# Patient Record
Sex: MALE | Race: WHITE | NOT HISPANIC OR LATINO | Employment: FULL TIME | URBAN - METROPOLITAN AREA
[De-identification: names, ages, dates, MRNs, and addresses within clinical notes are randomized per-mention and may not be internally consistent; named-entity substitution may affect disease eponyms.]

---

## 2017-04-10 ENCOUNTER — OFFICE VISIT - HEALTHEAST (OUTPATIENT)
Dept: FAMILY MEDICINE | Facility: CLINIC | Age: 27
End: 2017-04-10

## 2017-04-10 DIAGNOSIS — J45.30 ASTHMA, MILD PERSISTENT: ICD-10-CM

## 2017-04-10 DIAGNOSIS — M22.2X9 PATELLOFEMORAL PAIN SYNDROME: ICD-10-CM

## 2017-06-12 ENCOUNTER — COMMUNICATION - HEALTHEAST (OUTPATIENT)
Dept: SCHEDULING | Facility: CLINIC | Age: 27
End: 2017-06-12

## 2017-06-12 DIAGNOSIS — J45.30 ASTHMA, MILD PERSISTENT: ICD-10-CM

## 2017-06-17 ENCOUNTER — COMMUNICATION - HEALTHEAST (OUTPATIENT)
Dept: SCHEDULING | Facility: CLINIC | Age: 27
End: 2017-06-17

## 2017-06-17 ENCOUNTER — COMMUNICATION - HEALTHEAST (OUTPATIENT)
Dept: FAMILY MEDICINE | Facility: CLINIC | Age: 27
End: 2017-06-17

## 2017-06-17 DIAGNOSIS — J45.30 ASTHMA, MILD PERSISTENT: ICD-10-CM

## 2018-07-16 ENCOUNTER — OFFICE VISIT - HEALTHEAST (OUTPATIENT)
Dept: FAMILY MEDICINE | Facility: CLINIC | Age: 28
End: 2018-07-16

## 2018-07-16 DIAGNOSIS — J45.40 ASTHMA, MODERATE PERSISTENT, POORLY-CONTROLLED: ICD-10-CM

## 2018-07-16 DIAGNOSIS — Z00.00 ROUTINE GENERAL MEDICAL EXAMINATION AT A HEALTH CARE FACILITY: ICD-10-CM

## 2018-07-16 DIAGNOSIS — Z13.0 SCREENING FOR DEFICIENCY ANEMIA: ICD-10-CM

## 2018-07-16 DIAGNOSIS — R03.0 ELEVATED BLOOD PRESSURE READING WITHOUT DIAGNOSIS OF HYPERTENSION: ICD-10-CM

## 2018-07-16 DIAGNOSIS — Z13.1 SCREENING FOR DIABETES MELLITUS: ICD-10-CM

## 2018-07-16 DIAGNOSIS — Z13.220 SCREENING FOR CHOLESTEROL LEVEL: ICD-10-CM

## 2018-07-16 DIAGNOSIS — M76.892 HIP FLEXOR TENDINITIS, LEFT: ICD-10-CM

## 2018-07-16 DIAGNOSIS — F10.10 ALCOHOL ABUSE: ICD-10-CM

## 2018-07-16 LAB
ALBUMIN SERPL-MCNC: 4.4 G/DL (ref 3.5–5)
ALP SERPL-CCNC: 73 U/L (ref 45–120)
ALT SERPL W P-5'-P-CCNC: 55 U/L (ref 0–45)
ANION GAP SERPL CALCULATED.3IONS-SCNC: 13 MMOL/L (ref 5–18)
AST SERPL W P-5'-P-CCNC: 35 U/L (ref 0–40)
BILIRUB SERPL-MCNC: 0.3 MG/DL (ref 0–1)
BUN SERPL-MCNC: 13 MG/DL (ref 8–22)
CALCIUM SERPL-MCNC: 9.4 MG/DL (ref 8.5–10.5)
CHLORIDE BLD-SCNC: 106 MMOL/L (ref 98–107)
CHOLEST SERPL-MCNC: 267 MG/DL
CHOLEST SERPL-MCNC: 271 MG/DL
CO2 SERPL-SCNC: 25 MMOL/L (ref 22–31)
CREAT SERPL-MCNC: 0.87 MG/DL (ref 0.7–1.3)
ERYTHROCYTE [DISTWIDTH] IN BLOOD BY AUTOMATED COUNT: 11.1 % (ref 11–14.5)
GFR SERPL CREATININE-BSD FRML MDRD: >60 ML/MIN/1.73M2
GLUCOSE BLD-MCNC: 88 MG/DL (ref 70–125)
HCT VFR BLD AUTO: 42.1 % (ref 40–54)
HDLC SERPL-MCNC: 85 MG/DL
HGB BLD-MCNC: 14.5 G/DL (ref 14–18)
LDLC SERPL CALC-MCNC: 162 MG/DL
MCH RBC QN AUTO: 32.4 PG (ref 27–34)
MCHC RBC AUTO-ENTMCNC: 34.5 G/DL (ref 32–36)
MCV RBC AUTO: 94 FL (ref 80–100)
PLATELET # BLD AUTO: 232 THOU/UL (ref 140–440)
PMV BLD AUTO: 7.7 FL (ref 7–10)
POTASSIUM BLD-SCNC: 4 MMOL/L (ref 3.5–5)
PROT SERPL-MCNC: 7.4 G/DL (ref 6–8)
RBC # BLD AUTO: 4.48 MILL/UL (ref 4.4–6.2)
SODIUM SERPL-SCNC: 144 MMOL/L (ref 136–145)
TRIGL SERPL-MCNC: 102 MG/DL
TSH SERPL DL<=0.005 MIU/L-ACNC: 1.39 UIU/ML (ref 0.3–5)
WBC: 6.5 THOU/UL (ref 4–11)

## 2018-07-16 ASSESSMENT — MIFFLIN-ST. JEOR: SCORE: 1777.73

## 2018-11-14 ENCOUNTER — COMMUNICATION - HEALTHEAST (OUTPATIENT)
Dept: FAMILY MEDICINE | Facility: CLINIC | Age: 28
End: 2018-11-14

## 2019-04-19 ENCOUNTER — OFFICE VISIT - HEALTHEAST (OUTPATIENT)
Dept: FAMILY MEDICINE | Facility: CLINIC | Age: 29
End: 2019-04-19

## 2019-04-19 DIAGNOSIS — F10.10 ALCOHOL ABUSE: ICD-10-CM

## 2019-04-19 DIAGNOSIS — J45.40 ASTHMA, MODERATE PERSISTENT, POORLY-CONTROLLED: ICD-10-CM

## 2019-04-19 DIAGNOSIS — R03.0 ELEVATED BLOOD PRESSURE READING WITHOUT DIAGNOSIS OF HYPERTENSION: ICD-10-CM

## 2019-04-19 DIAGNOSIS — M70.61 GREATER TROCHANTERIC BURSITIS OF RIGHT HIP: ICD-10-CM

## 2019-04-19 ASSESSMENT — MIFFLIN-ST. JEOR: SCORE: 1824.78

## 2019-10-10 ENCOUNTER — COMMUNICATION - HEALTHEAST (OUTPATIENT)
Dept: FAMILY MEDICINE | Facility: CLINIC | Age: 29
End: 2019-10-10

## 2020-01-09 ENCOUNTER — OFFICE VISIT - HEALTHEAST (OUTPATIENT)
Dept: FAMILY MEDICINE | Facility: CLINIC | Age: 30
End: 2020-01-09

## 2020-01-09 ENCOUNTER — COMMUNICATION - HEALTHEAST (OUTPATIENT)
Dept: FAMILY MEDICINE | Facility: CLINIC | Age: 30
End: 2020-01-09

## 2020-01-09 DIAGNOSIS — J45.40 ASTHMA, MODERATE PERSISTENT, POORLY-CONTROLLED: ICD-10-CM

## 2020-01-09 DIAGNOSIS — F17.210 CIGARETTE NICOTINE DEPENDENCE WITHOUT COMPLICATION: ICD-10-CM

## 2020-01-09 DIAGNOSIS — Z11.3 SCREEN FOR STD (SEXUALLY TRANSMITTED DISEASE): ICD-10-CM

## 2020-01-09 LAB — HIV 1+2 AB+HIV1 P24 AG SERPL QL IA: NEGATIVE

## 2020-01-10 LAB
C TRACH DNA SPEC QL PROBE+SIG AMP: NEGATIVE
HBV SURFACE AB SERPL IA-ACNC: POSITIVE M[IU]/ML
HBV SURFACE AG SERPL QL IA: NEGATIVE
HCV AB SERPL QL IA: NEGATIVE
HSV1 IGG SERPL QL IA: NEGATIVE
HSV2 IGG SERPL QL IA: NEGATIVE
N GONORRHOEA DNA SPEC QL NAA+PROBE: NEGATIVE
T PALLIDUM AB SER QL: NEGATIVE

## 2020-05-12 ENCOUNTER — COMMUNICATION - HEALTHEAST (OUTPATIENT)
Dept: FAMILY MEDICINE | Facility: CLINIC | Age: 30
End: 2020-05-12

## 2020-05-13 ENCOUNTER — COMMUNICATION - HEALTHEAST (OUTPATIENT)
Dept: FAMILY MEDICINE | Facility: CLINIC | Age: 30
End: 2020-05-13

## 2020-05-13 ENCOUNTER — OFFICE VISIT - HEALTHEAST (OUTPATIENT)
Dept: FAMILY MEDICINE | Facility: CLINIC | Age: 30
End: 2020-05-13

## 2020-05-13 ENCOUNTER — AMBULATORY - HEALTHEAST (OUTPATIENT)
Dept: FAMILY MEDICINE | Facility: CLINIC | Age: 30
End: 2020-05-13

## 2020-05-13 DIAGNOSIS — R39.9 URINARY SYMPTOM OR SIGN: ICD-10-CM

## 2020-05-13 LAB
ALBUMIN UR-MCNC: NEGATIVE MG/DL
APPEARANCE UR: CLEAR
BILIRUB UR QL STRIP: NEGATIVE
COLOR UR AUTO: YELLOW
GLUCOSE UR STRIP-MCNC: NEGATIVE MG/DL
HGB UR QL STRIP: NEGATIVE
KETONES UR STRIP-MCNC: NEGATIVE MG/DL
LEUKOCYTE ESTERASE UR QL STRIP: NEGATIVE
NITRATE UR QL: NEGATIVE
PH UR STRIP: 7 [PH] (ref 5–8)
SP GR UR STRIP: 1.01 (ref 1–1.03)
UROBILINOGEN UR STRIP-ACNC: NORMAL

## 2020-05-15 ENCOUNTER — COMMUNICATION - HEALTHEAST (OUTPATIENT)
Dept: FAMILY MEDICINE | Facility: CLINIC | Age: 30
End: 2020-05-15

## 2020-05-15 LAB
C TRACH DNA SPEC QL PROBE+SIG AMP: NEGATIVE
N GONORRHOEA DNA SPEC QL NAA+PROBE: NEGATIVE

## 2020-05-18 ENCOUNTER — OFFICE VISIT - HEALTHEAST (OUTPATIENT)
Dept: FAMILY MEDICINE | Facility: CLINIC | Age: 30
End: 2020-05-18

## 2020-05-18 DIAGNOSIS — R10.31 RLQ ABDOMINAL PAIN: ICD-10-CM

## 2020-05-18 DIAGNOSIS — R71.0 DECREASED HEMOGLOBIN: ICD-10-CM

## 2020-05-18 DIAGNOSIS — K52.9 FREQUENT STOOLS: ICD-10-CM

## 2020-05-18 LAB
ALBUMIN SERPL-MCNC: 3.3 G/DL (ref 3.5–5)
ALBUMIN UR-MCNC: NEGATIVE MG/DL
ALP SERPL-CCNC: 177 U/L (ref 45–120)
ALT SERPL W P-5'-P-CCNC: 101 U/L (ref 0–45)
ANION GAP SERPL CALCULATED.3IONS-SCNC: 8 MMOL/L (ref 5–18)
APPEARANCE UR: CLEAR
AST SERPL W P-5'-P-CCNC: 56 U/L (ref 0–40)
BILIRUB SERPL-MCNC: 0.7 MG/DL (ref 0–1)
BILIRUB UR QL STRIP: NEGATIVE
BUN SERPL-MCNC: 11 MG/DL (ref 8–22)
C REACTIVE PROTEIN LHE: 0.3 MG/DL (ref 0–0.8)
CALCIUM SERPL-MCNC: 9.1 MG/DL (ref 8.5–10.5)
CHLORIDE BLD-SCNC: 103 MMOL/L (ref 98–107)
CO2 SERPL-SCNC: 30 MMOL/L (ref 22–31)
COLOR UR AUTO: YELLOW
CREAT SERPL-MCNC: 0.76 MG/DL (ref 0.7–1.3)
ERYTHROCYTE [DISTWIDTH] IN BLOOD BY AUTOMATED COUNT: 11.5 % (ref 11–14.5)
GFR SERPL CREATININE-BSD FRML MDRD: >60 ML/MIN/1.73M2
GLUCOSE BLD-MCNC: 99 MG/DL (ref 70–125)
GLUCOSE UR STRIP-MCNC: NEGATIVE MG/DL
HCT VFR BLD AUTO: 39.4 % (ref 40–54)
HGB BLD-MCNC: 13.5 G/DL (ref 14–18)
HGB UR QL STRIP: NEGATIVE
KETONES UR STRIP-MCNC: NEGATIVE MG/DL
LEUKOCYTE ESTERASE UR QL STRIP: NEGATIVE
MCH RBC QN AUTO: 34.7 PG (ref 27–34)
MCHC RBC AUTO-ENTMCNC: 34.3 G/DL (ref 32–36)
MCV RBC AUTO: 101 FL (ref 80–100)
NITRATE UR QL: NEGATIVE
PH UR STRIP: 7 [PH] (ref 5–8)
PLATELET # BLD AUTO: 371 THOU/UL (ref 140–440)
PMV BLD AUTO: 7.2 FL (ref 7–10)
POTASSIUM BLD-SCNC: 4.8 MMOL/L (ref 3.5–5)
PROT SERPL-MCNC: 6.5 G/DL (ref 6–8)
RBC # BLD AUTO: 3.89 MILL/UL (ref 4.4–6.2)
SODIUM SERPL-SCNC: 141 MMOL/L (ref 136–145)
SP GR UR STRIP: 1.02 (ref 1–1.03)
UROBILINOGEN UR STRIP-ACNC: NORMAL
WBC: 5.3 THOU/UL (ref 4–11)

## 2020-05-18 ASSESSMENT — MIFFLIN-ST. JEOR: SCORE: 1910.88

## 2020-06-29 ENCOUNTER — COMMUNICATION - HEALTHEAST (OUTPATIENT)
Dept: FAMILY MEDICINE | Facility: CLINIC | Age: 30
End: 2020-06-29

## 2020-06-29 DIAGNOSIS — R10.31 RLQ ABDOMINAL PAIN: ICD-10-CM

## 2020-07-08 ENCOUNTER — HOSPITAL ENCOUNTER (OUTPATIENT)
Dept: CT IMAGING | Facility: CLINIC | Age: 30
Discharge: HOME | End: 2020-07-08
Attending: FAMILY MEDICINE

## 2020-07-08 DIAGNOSIS — R10.31 RLQ ABDOMINAL PAIN: ICD-10-CM

## 2020-07-10 ENCOUNTER — AMBULATORY - HEALTHEAST (OUTPATIENT)
Dept: FAMILY MEDICINE | Facility: CLINIC | Age: 30
End: 2020-07-10

## 2020-07-10 DIAGNOSIS — R10.31 ABDOMINAL PAIN, RIGHT LOWER QUADRANT: ICD-10-CM

## 2020-07-10 DIAGNOSIS — R10.32 ABDOMINAL PAIN, LEFT LOWER QUADRANT: ICD-10-CM

## 2020-08-10 ENCOUNTER — AMBULATORY - HEALTHEAST (OUTPATIENT)
Dept: FAMILY MEDICINE | Facility: CLINIC | Age: 30
End: 2020-08-10

## 2020-08-10 DIAGNOSIS — R10.31 RLQ ABDOMINAL PAIN: ICD-10-CM

## 2020-08-10 DIAGNOSIS — K52.9 FREQUENT STOOLS: ICD-10-CM

## 2020-10-01 ENCOUNTER — COMMUNICATION - HEALTHEAST (OUTPATIENT)
Dept: FAMILY MEDICINE | Facility: CLINIC | Age: 30
End: 2020-10-01

## 2020-10-07 ENCOUNTER — COMMUNICATION - HEALTHEAST (OUTPATIENT)
Dept: FAMILY MEDICINE | Facility: CLINIC | Age: 30
End: 2020-10-07

## 2020-10-07 DIAGNOSIS — J45.40 ASTHMA, MODERATE PERSISTENT, POORLY-CONTROLLED: ICD-10-CM

## 2020-10-15 ENCOUNTER — OFFICE VISIT - HEALTHEAST (OUTPATIENT)
Dept: SURGERY | Facility: CLINIC | Age: 30
End: 2020-10-15

## 2020-10-15 DIAGNOSIS — R10.31 RIGHT LOWER QUADRANT PAIN: ICD-10-CM

## 2020-10-15 ASSESSMENT — MIFFLIN-ST. JEOR: SCORE: 1978.86

## 2020-11-30 ENCOUNTER — COMMUNICATION - HEALTHEAST (OUTPATIENT)
Dept: FAMILY MEDICINE | Facility: CLINIC | Age: 30
End: 2020-11-30

## 2020-11-30 DIAGNOSIS — J45.40 ASTHMA, MODERATE PERSISTENT, POORLY-CONTROLLED: ICD-10-CM

## 2020-11-30 RX ORDER — ALBUTEROL SULFATE 90 UG/1
2 AEROSOL, METERED RESPIRATORY (INHALATION) EVERY 6 HOURS PRN
Qty: 3 INHALER | Refills: 2 | Status: SHIPPED | OUTPATIENT
Start: 2020-11-30 | End: 2021-09-15

## 2021-03-20 ENCOUNTER — COMMUNICATION - HEALTHEAST (OUTPATIENT)
Dept: FAMILY MEDICINE | Facility: CLINIC | Age: 31
End: 2021-03-20

## 2021-03-20 DIAGNOSIS — J45.40 ASTHMA, MODERATE PERSISTENT, POORLY-CONTROLLED: ICD-10-CM

## 2021-03-20 RX ORDER — DEXAMETHASONE 4 MG/1
TABLET ORAL
Qty: 1 INHALER | Refills: 0 | Status: SHIPPED | OUTPATIENT
Start: 2021-03-20 | End: 2021-09-15

## 2021-05-26 VITALS — TEMPERATURE: 97.8 F

## 2021-05-28 ASSESSMENT — ASTHMA QUESTIONNAIRES
ACT_TOTALSCORE: 17
ACT_TOTALSCORE: 13

## 2021-05-28 NOTE — PROGRESS NOTES
Assessment/ Plan  1. Greater trochanteric bursitis of right hip  Not all indicators fit with this diagnosis, does not have a lot of tenderness over the greater trochanter but does have lateral hip pain, hurts with weightbearing, some tenderness present.  Discussed stretching exercises, recommend ibuprofen or Tylenol, heat or ice, this is only 4 days old, if it does not improve over the next week, I think he should come in for an x-ray.  Apologized for the lack of x-ray availability today.  2. Asthma, moderate persistent, poorly-controlled  Restart medications Symbicort and albuterol, encourage smoking cessation  - albuterol (VENTOLIN HFA) 90 mcg/actuation inhaler; Inhale 2 puffs every 6 (six) hours as needed for wheezing.  Dispense: 3 Inhaler; Refill: 2  - budesonide-formoterol (SYMBICORT) 160-4.5 mcg/actuation inhaler; Inhale 1 puff 2 (two) times a day.  Dispense: 1 Inhaler; Refill: 2    3. Elevated blood pressure reading without diagnosis of hypertension  At a level that needs to be treated if it persists.  Likely related to alcohol overuse  Recommend getting blood pressures checked 3 or 4 times, following up within 2 to 4 weeks with primary care doctor.  4. Alcohol abuse  That this is previously been discussed with primary care, offered referral to chemical dependency for intake.  Patient declined for now.    Body mass index is 31.09 kg/m .    Subjective  CC:  Chief Complaint   Patient presents with     Hip Pain     right hip     HPI:  RHip pain    Duration/ timing of onset: 4 days  Location of pain lat and then posterior    Severity/ Quality: dull, sharp with movement  Modifying factors: Make it worse- lifting leg   Make it better-rest  History of hip  problems/injury or previous work-up/ treatment? Saw someone      Discussed patient's elevated blood pressure today.  He is adopted, does not know family history    Drinks fairly heavily, regularly, drink last night  Indicates that he knows it is a problem.  Not  "currently ready to do treatment.    Asthma has been out of control 2.  He smokes cigarettes and is been out of medications for a while.  Chronic congested cough, mild shortness of breath    Patient Active Problem List   Diagnosis     Paronychia of finger     Asthma, moderate persistent, poorly-controlled     Current medications reviewed as follows:  Current Outpatient Medications on File Prior to Visit   Medication Sig     [DISCONTINUED] albuterol (VENTOLIN HFA) 90 mcg/actuation inhaler Inhale 2 puffs every 6 (six) hours as needed for wheezing.     [DISCONTINUED] budesonide-formoterol (SYMBICORT) 160-4.5 mcg/actuation inhaler Inhale 1 puff 2 (two) times a day.     Current Facility-Administered Medications on File Prior to Visit   Medication     cefTRIAXone injection 250 mg (ROCEPHIN)     Social History     Tobacco Use   Smoking Status Current Every Day Smoker     Packs/day: 1.00   Smokeless Tobacco Never Used     Social History     Social History Narrative     Not on file     Patient Care Team:  Josette Wright CNP as PCP - General (Nurse Practitioner)  ROS  Full 10 system review including constitutional, respiratory, cardiac, gi, urinary, rheumatologic, neurologic, reproductive, dermatologic psychiatric is  performed (via questionnaire) and is negative         Objective  Physical Exam  Vitals:    04/19/19 1022   BP: (!) 160/100   Patient Site: Right Arm   Patient Position: Sitting   Cuff Size: Adult Regular   Pulse: 88   Resp: 18   Weight: 200 lb (90.7 kg)   Height: 5' 7.25\" (1.708 m)     Right hip is examined.  There is some tenderness with external rotation on the lateral side, mild tenderness over palpation over the greater trochanter.  Tenderness with internal rotation, negative straight leg raise, no tenderness over the i iliac spine, sacroiliac region or or the ischio tuberosity  Diagnostics  None    Please note: Voice recognition software was used in this dictation.  It may therefore contain typographical " errors.

## 2021-05-30 VITALS — BODY MASS INDEX: 30.07 KG/M2 | WEIGHT: 192 LBS

## 2021-06-01 VITALS — WEIGHT: 190.5 LBS | HEIGHT: 67 IN | BODY MASS INDEX: 29.9 KG/M2

## 2021-06-02 VITALS — HEIGHT: 67 IN | BODY MASS INDEX: 31.39 KG/M2 | WEIGHT: 200 LBS

## 2021-06-03 VITALS
WEIGHT: 207 LBS | DIASTOLIC BLOOD PRESSURE: 88 MMHG | OXYGEN SATURATION: 98 % | BODY MASS INDEX: 32.18 KG/M2 | SYSTOLIC BLOOD PRESSURE: 140 MMHG | HEART RATE: 83 BPM

## 2021-06-04 VITALS
SYSTOLIC BLOOD PRESSURE: 148 MMHG | WEIGHT: 234 LBS | DIASTOLIC BLOOD PRESSURE: 98 MMHG | BODY MASS INDEX: 36.73 KG/M2 | HEIGHT: 67 IN

## 2021-06-04 VITALS
BODY MASS INDEX: 34.37 KG/M2 | SYSTOLIC BLOOD PRESSURE: 126 MMHG | DIASTOLIC BLOOD PRESSURE: 80 MMHG | HEIGHT: 67 IN | OXYGEN SATURATION: 98 % | TEMPERATURE: 98.8 F | HEART RATE: 99 BPM | RESPIRATION RATE: 18 BRPM | WEIGHT: 219 LBS

## 2021-06-05 NOTE — PROGRESS NOTES
OFFICE VISIT - FAMILY MEDICINE     ASSESSMENT AND PLAN     1. Screen for STD (sexually transmitted disease)  HIV Antigen/Antibody Screening Cascade    Chlamydia trachomatis & Neisseria gonorrhoeae, Amplified Detection    Syphilis Screen, Cascade    Hepatitis B Surface Antibody (Anti-HBs) Vaccine Check    Hepatitis B Surface Antigen (HBsAG)    Hepatitis C Antibody (Anti-HCV)    Herpes simplex Virus (Type 1 and 2) IgG Antibody   2. Asthma, moderate persistent, poorly-controlled  albuterol (VENTOLIN HFA) 90 mcg/actuation inhaler    fluticasone propionate (FLOVENT HFA) 110 mcg/actuation inhaler    predniSONE (DELTASONE) 10 mg tablet    DISCONTINUED: pneumococcal vaccine (PNEUMOVAX-23) 25 mcg/0.5 mL injection    DISCONTINUED: predniSONE (DELTASONE) 10 mg tablet   3. Cigarette nicotine dependence without complication     Screening STD and safe sex personally discussed.  Lab result pending.  Asthma, discussed management with smoking cessation, Symbicort too expensive, trial of Flovent daily, and albuterol as needed, pneumonia and flu vaccine today.  Asthma action plan reviewed with the patient.  A total of  4  minutes was spent discussing tobacco cessation program and different options treatment.    CHIEF COMPLAINT   STD testing; refill inhalers (pt requesting generic due to price); and possible flu shot    HPI   Faustino Power is a 29 y.o. male.  No Patient Care Coordination Note on file.    New girlfriend would like him to be checked for STDs, has not had any symptoms.  Does have a remote history of chlamydia that was treated.  Moderate asthma unfortunately smoker, we discussed the importance of smoking cessation.  Current control Symbicort s very expensive about $300.  Has tried Flovent in the past and responded well.      Review of Systems As per HPI, otherwise negative.    OBJECTIVE   /88   Pulse 83   Wt 207 lb (93.9 kg)   SpO2 98%   BMI 32.18 kg/m    Physical Exam    PFSH   No family history on  file.  Social History     Socioeconomic History     Marital status: Single     Spouse name: Not on file     Number of children: Not on file     Years of education: Not on file     Highest education level: Not on file   Occupational History     Not on file   Social Needs     Financial resource strain: Not on file     Food insecurity:     Worry: Not on file     Inability: Not on file     Transportation needs:     Medical: Not on file     Non-medical: Not on file   Tobacco Use     Smoking status: Current Every Day Smoker     Packs/day: 1.00     Smokeless tobacco: Never Used   Substance and Sexual Activity     Alcohol use: Yes     Alcohol/week: 28.0 standard drinks     Types: 14 Glasses of wine, 14 Cans of beer per week     Drug use: Not on file     Sexual activity: Not on file   Lifestyle     Physical activity:     Days per week: Not on file     Minutes per session: Not on file     Stress: Not on file   Relationships     Social connections:     Talks on phone: Not on file     Gets together: Not on file     Attends Islam service: Not on file     Active member of club or organization: Not on file     Attends meetings of clubs or organizations: Not on file     Relationship status: Not on file     Intimate partner violence:     Fear of current or ex partner: Not on file     Emotionally abused: Not on file     Physically abused: Not on file     Forced sexual activity: Not on file   Other Topics Concern     Not on file   Social History Narrative     Not on file     Relevant history was reviewed with the patient today, unless noted in HPI, nothing is pertinent for this visit.  [facility]     Patient Active Problem List    Diagnosis Date Noted     Cigarette nicotine dependence without complication 01/09/2020     Asthma, moderate persistent, poorly-controlled 04/10/2017     Paronychia of finger 06/18/2015     No past surgical history on file.    RESULTS/CONSULTS (Lab/Rad)   No results found for this or any previous visit (from  the past 168 hour(s)).  No results found.  MEDICATIONS     Current Outpatient Medications on File Prior to Visit   Medication Sig Dispense Refill     [DISCONTINUED] albuterol (VENTOLIN HFA) 90 mcg/actuation inhaler Inhale 2 puffs every 6 (six) hours as needed for wheezing. 3 Inhaler 2     [DISCONTINUED] budesonide-formoterol (SYMBICORT) 160-4.5 mcg/actuation inhaler Inhale 1 puff 2 (two) times a day. 1 Inhaler 2     Current Facility-Administered Medications on File Prior to Visit   Medication Dose Route Frequency Provider Last Rate Last Dose     cefTRIAXone injection 250 mg (ROCEPHIN)  250 mg Intramuscular Q24H Hannah Muller DO   250 mg at 12/07/16 1115       HEALTH MAINTENANCE / SCREENING   PHQ-2 Total Score: 0 (1/9/2020  1:20 PM)  , No data recorded,No data recorded  Immunization History   Administered Date(s) Administered     Dtap 1989, 1989, 01/31/1990, 02/05/1991, 08/17/1994     HPV Quadrivalent 09/05/2012     Hep A, Adult IM (19yr & older) 09/05/2012, 07/16/2018     Hep B, Peds or Adolescent 08/27/1998, 10/01/1998, 08/19/1999     Hib (PRP-T) 11/05/1990     Influenza, Seasonal, Inj PF IIV3 12/10/2007, 09/05/2012     Influenza,seasonal quad, PF, =/> 6months 12/07/2016, 01/09/2020     Influenza,seasonal, Inj IIV3 11/24/2003, 10/13/2004, 12/27/2006, 10/27/2014     MMR 05/24/1990, 11/05/1990, 10/01/1998     Meningococcal MCV4P 12/10/2007     OPV,Trivalent,Historic(1967-1660 only) 1989, 1989, 02/05/1991, 08/17/1994     Pneumo Polysac 23-V 01/09/2020     Td, adult adsorbed, PF 07/16/2018     Tdap 07/17/2000, 12/10/2007     Varicella 12/27/2006, 09/27/2008     Health Maintenance   Topic     ASTHMA ACTION PLAN      PNEUMOCOCCAL IMMUNIZATION 19-64 MEDIUM RISK (1 of 1 - PPSV23)     PREVENTIVE CARE VISIT      INFLUENZA VACCINE RULE BASED (1)     ASTHMA CONTROL TEST      ADVANCE CARE PLANNING      TD 18+ HE      HIV SCREENING      TDAP ADULT ONE TIME DOSE      I have counseled the patient for  tobacco cessation and the follow up will occur  at the next visit.  Rohan Holguin MD  Family Medicine, [facility]     This note was dictated using a voice recognition software.  Any grammatical or context distortion are unintentional and inherent to the software.

## 2021-06-08 NOTE — PROGRESS NOTES
Assessment and Plan    RLQ pain ongoing, spreading to left and down into groin, normal abdominal exam, no red flags. Had been drinking half a liter a day - now less - also smokes and drinks lots of coffee. Long history of stool frequency 5x/day, less recently but still at least 3x/day.  Hemoglobin low. Has psoriasis. Notes hip to back pain. No clear diagnosis, but auto-immune colitis is in the differential. Hgb came back lower that previous, so will have him do take home hemoccult test    1. RLQ abdominal pain/2. Frequent stools    - HM2(CBC w/o Differential)  - Comprehensive Metabolic Panel  - Urinalysis-UC if Indicated  - C-Reactive Protein  - Fecal Occult Blood X 3(Clinic Only); Future    3.. Decreased hemoglobin  - Fecal Occult Blood X 3(Clinic Only); Future        Return for if symptoms are not improving, and pending results of tests.    Audrey Loja MD     -------------------------------------------    Chief Complaint   Patient presents with     Abdominal Pain     RLQ  with trouble urinating      pain spreading into right testicle  and into mid pelvic area     Virtual visit Dr. Marcelo 5/13/20    HPI: Patient c/o onset of RLQ pressure with brief pain starting two weeks ago.  He also noted somewhat decreased urine production and darker colored urine.  He has not noted burning during urination but sometimes gets a sharp pain after urination. No odor to urine.  No blood noted in urine.  He does c/o frequency of urination--often small amount of urine and needing to urinate soon after.  He has noted that it is a little hard to get urine started.  He denies penile discharge.  No vomiting or diarrhea.  No bulging in either groin.   No testicular pain.  No pain with his palpation of abdomen.  No fever.  No new sexual partners.  Had not had intercourse for two months, then one time about a week ago after these symptoms had started  Has had chlamydia infection a few years ago--These symptoms are not the same.  1.  "Urinary symptom or sign  Symptoms unlikely to represent epididymitis, appendicitis, hernia.  Will evaluate for UTI and chlamydia/gonorrhea.  If these tests are normal, will likely need in person visit.     Continue adequate fluid intake.     - Urinalysis-UC if Indicated  - Chlamydia trachomatis & Neisseria gonorrhoeae, Amplified Detection    Lab Results   Component Value Date    COLORU Yellow 05/18/2020    CLARITYU Clear 05/18/2020    GLUCOSEU Negative 05/18/2020    BILIRUBINUR Negative 05/18/2020    KETONESU Negative 05/18/2020    SPECGRAV 1.025 05/18/2020    HGBUA Negative 05/18/2020    PHUR 7.0 05/18/2020    PROTEINUA Negative 05/18/2020    UROBILINOGEN 1.0 E.U./dL 05/18/2020    NITRITE Negative 05/18/2020    LEUKOCYTESUR Negative 05/18/2020    BACTERIA Few (!) 06/18/2015    RBCUA 0-5 06/18/2015    WBCUA 10-25 (!) 06/18/2015    SQUAMEPI 0-5 06/18/2015     ----  Faustino reviewed that his symptoms started after three days where he did feel sick and had some vomiting; it was also after a period where he was drinking more heavily.    His RLQ  pain is no worse, just not better and now spreading to left lower abdomen and lower down on right into groin and thigh.  He describes it as more of a dull ache or uncomfortableness. He has no pain with eating. Sitting down, bending over , and lying on his right side  Make it worse    Drinking also makes the pain worse.  His \"normal\" intake is half a liter or whisky daily, but he has been trying to cut back. He acknowledges hearing from his providers that this is a problem and is dangerous - he has been trying to cut back recently.  He also \"smokes a lot and drinks a lot of coffee.\"    No difficulty with BMs. For the last 5-10 years has had loose BMs - recently firmer - being at home and eating 3+ meals a day - but he still has at least 3 stools a day    Also since this started happening 3 weeks ago, feels like he has to pee more often , but less successful - hard to start, " doesn't flow as well, prolonged dribbling - if he drinks a lot of water it resumes normally.  No blood in urine.  Hurts a little toward the end with urination; not so much when urine is more dilute    ROS    No fever  No vomiting or lack of appetite  No weight loss  Using abdominal muscles hurts a little  Adopted  no foreign travel, No camping or other clear exposure to parasite  Has had joint pains for the last year - pain in both hips - right or left hip - in front and side at beginning of the day, more in back at then end of the day - wakes up with it  psoriasis    ACT Total Score: (!) 17 (5/18/2020  1:06 PM)  (not discussed today)      Current Outpatient Medications on File Prior to Visit   Medication Sig Dispense Refill     albuterol (VENTOLIN HFA) 90 mcg/actuation inhaler Inhale 2 puffs every 6 (six) hours as needed for wheezing. 3 Inhaler 2     fluticasone propionate (FLOVENT HFA) 110 mcg/actuation inhaler Inhale 2 puffs 2 (two) times a day. 1 Inhaler 2     No current facility-administered medications on file prior to visit.              The history section was last reviewed by Suzanne Bender CMA on May 18, 2020.    Social History     Tobacco Use   Smoking Status Current Every Day Smoker     Packs/day: 1.00   Smokeless Tobacco Never Used       Social History     Substance and Sexual Activity   Alcohol Use Yes     Alcohol/week: 28.0 standard drinks     Types: 14 Glasses of wine, 14 Cans of beer per week         Vitals:    05/18/20 1308   BP: 126/80   Pulse: 99   Resp: 18   Temp: 98.8  F (37.1  C)   SpO2: 98%     Body mass index is 34.05 kg/m .     EXAM:    General appearance - alert, well appearing, and in no distress and overweight  Mental status - normal mood, behavior, speech, dress, motor activity, and thought processes  Chest - clear to auscultation except for scattered faint inspiratory wheezes  Heart - normal rate, regular rhythm, normal S1, S2, no murmurs, rubs, clicks or gallops  Abdomen - soft,  nontender, nondistended, no masses or organomegaly   Male - SCROTUM: normal, no masses, no tenderness    Recent Results (from the past 24 hour(s))   HM2(CBC w/o Differential)    Collection Time: 05/18/20  1:54 PM   Result Value Ref Range    WBC 5.3 4.0 - 11.0 thou/uL    RBC 3.89 (L) 4.40 - 6.20 mill/uL    Hemoglobin 13.5 (L) 14.0 - 18.0 g/dL    Hematocrit 39.4 (L) 40.0 - 54.0 %     (H) 80 - 100 fL    MCH 34.7 (H) 27.0 - 34.0 pg    MCHC 34.3 32.0 - 36.0 g/dL    RDW 11.5 11.0 - 14.5 %    Platelets 371 140 - 440 thou/uL    MPV 7.2 7.0 - 10.0 fL   Urinalysis-UC if Indicated    Collection Time: 05/18/20  1:57 PM   Result Value Ref Range    Color, UA Yellow Colorless, Yellow, Straw, Light Yellow    Clarity, UA Clear Clear    Glucose, UA Negative Negative    Bilirubin, UA Negative Negative    Ketones, UA Negative Negative    Specific Gravity, UA 1.025 1.005 - 1.030    Blood, UA Negative Negative    pH, UA 7.0 5.0 - 8.0    Protein, UA Negative Negative mg/dL    Urobilinogen, UA 1.0 E.U./dL 0.2 E.U./dL, 1.0 E.U./dL    Nitrite, UA Negative Negative    Leukocytes, UA Negative Negative

## 2021-06-08 NOTE — TELEPHONE ENCOUNTER
Left message to call back for: Patient  Information to relay to patient:  Left detailed message for pt letting him know that that we just got word a little bit ago that Josette is going to be starting her maternity leave and will not be available tomorrow for the telephone visit that he has scheduled.  I advised he call back and we can reschedule him with someone else that has openings.

## 2021-06-08 NOTE — PROGRESS NOTES
"  Faustino Power is a 29 y.o. male who is being evaluated via a billable telephone visit.      The patient has been notified of following:     \"This telephone visit will be conducted via a call between you and your physician/provider. We have found that certain health care needs can be provided without the need for a physical exam.  This service lets us provide the care you need with a short phone conversation.  If a prescription is necessary we can send it directly to your pharmacy.  If lab work is needed we can place an order for that and you can then stop by our lab to have the test done at a later time.    Telephone visits are billed at different rates depending on your insurance coverage. During this emergency period, for some insurers they may be billed the same as an in-person visit.  Please reach out to your insurance provider with any questions.    If during the course of the call the physician/provider feels a telephone visit is not appropriate, you will not be charged for this service.\"    Patient has given verbal consent to a Telephone visit? Yes    Patient would like to receive their AVS by AVS Preference: Jeannette.    Subjective:     Faustino Power is a 29 y.o. male who calls with complaint of    Chief Complaint   Patient presents with     Urinary Tract Infection     Patient was evaluated by telephone rather than an in person visit due to currently outbreak of COVID-19.        The following portions of the patient's history were reviewed and updated as appropriate: allergies, current medications and problem list     HPI: Patient c/o onset of RLQ pressure with brief pain starting two weeks ago.  He also noted somewhat decreased urine production and darker colored urine.  He has not noted burning during urination but sometimes gets a sharp pain after urination. No odor to urine.  No blood noted in urine.  He does c/o frequency of urination--often small amount of urine and needing to urinate soon after.  " He has noted that it is a little hard to get urine started.  He denies penile discharge.  No vomiting or diarrhea.  No bulging in either groin.   No testicular pain.  No pain with his palpation of abdomen.  No fever.  No new sexual partners.  Had not had intercourse for two months, then one time about a week ago after these symptoms had started  Has had chlamydia infection a few years ago--These symptoms are not the same.    Has tried drinking more water and cranberry juice with slight lessening of symptoms.    No past history of bladder infections    Was sick about one month ago with vomiting and headache for three days.  Symptoms completely resolved    Review of Systems:  Remainder of complete review systems is negative.     Patient Active Problem List   Diagnosis     Asthma, moderate persistent, poorly-controlled     Cigarette nicotine dependence without complication          Current Outpatient Medications:      albuterol (VENTOLIN HFA) 90 mcg/actuation inhaler, Inhale 2 puffs every 6 (six) hours as needed for wheezing., Disp: 3 Inhaler, Rfl: 2     fluticasone propionate (FLOVENT HFA) 110 mcg/actuation inhaler, Inhale 2 puffs 2 (two) times a day., Disp: 1 Inhaler, Rfl: 2  No current facility-administered medications for this visit.      Allergies  Patient has no known allergies.        Tobacco Use      Smoking status: Current Every Day Smoker        Packs/day: 1.00      Smokeless tobacco: Never Used        Objective     Vitals (patient-reported): Temp 97.8  F (36.6  C) (Temporal)      Gen:  Voice and speech are normal.  Resp: Able to speak in full sentences.    Assessment/Plan:     1. Urinary symptom or sign  Symptoms unlikely to represent epididymitis, appendicitis, hernia.  Will evaluate for UTI and chlamydia/gonorrhea.  If these tests are normal, will likely need in person visit.    Continue adequate fluid intake.    - Urinalysis-UC if Indicated  - Chlamydia trachomatis & Neisseria gonorrhoeae, Amplified  Detection         Return for To Be Determined.     Time visit started:12:59 PM   Time visit ended: 1:15 PM     Phone call duration:  16 minutes  [id number] (</= 10 min)  [id number] (11-20 min)  21994 (21-30 min)        Winter Marcelo MD

## 2021-06-08 NOTE — PATIENT INSTRUCTIONS - HE
We will check a urine for infection and sexually transmitted infections and notify you of the results and any further tests or treatment needed.  Continue good fluid intake--64 oz should be adequate unless more desired.  May continue cranberry juice if you desire.    As discussed, I highly recommend quitting smoking.  I also recommend using your fluticasone inhaler daily so you can decrease the use of your albuterol inhaler.

## 2021-06-10 NOTE — PROGRESS NOTES
Subjective   Chief Complaint:  Knee Pain (R knee pain x 1 month; mainly hurts when sitting, going from sitting to standing, sometimes while walking) and Medication Refill (albuterol inhaler)    HPI:   Faustino Power is a 26 y.o. male who presents for evaluation of knee pain.      Right knee:  Pain began gradually approximately one month. Below knee cap on both medial/lateral side.  Pain with driving, moving from sitting to standing, walking up and down stairs.  No instability.  No crepitus. No swelling.  Seems to be staying about the same.      He works as a , 12-13 hours a day on his feet.  No formal exercise.      Asthma:  History of mild persistent asthma. He states he feels asthma is in good control though also admits to feeling tightness and shortness of breath twice daily.  Takes albuterol most mornings and before bed.  Currently on Flovent 220, doing one puff in the AM and sometimes also in the pm if he remembers. He is a current everyday smoker.  He is in the pre-contemplation phase. States he has no plans to consider quitting currently.     PMH:   Patient Active Problem List   Diagnosis     Paronychia of finger       No past medical history on file.    Current Medications:   Current Outpatient Prescriptions on File Prior to Visit   Medication Sig Dispense Refill     albuterol (VENTOLIN HFA) 90 mcg/actuation inhaler Inhale 2 puffs every 6 (six) hours as needed for wheezing. 1 Inhaler 0     fluticasone (FLOVENT HFA) 220 mcg/actuation inhaler Inhale 2 puffs 2 (two) times a day. 1 Inhaler 2     Current Facility-Administered Medications on File Prior to Visit   Medication Dose Route Frequency Provider Last Rate Last Dose     cefTRIAXone injection 250 mg (ROCEPHIN)  250 mg Intramuscular Q24H Hannah Muller DO   250 mg at 12/07/16 1115       Allergies:  has No Known Allergies.    SH/FH:  Social History and Family History reviewed and updated.   Tobacco Status:  He  reports that he has been smoking.  He has  been smoking about 1.00 pack per day. He does not have any smokeless tobacco history on file.    Review of Systems:  A complete head to toe ROS is negative unless otherwise noted in HPI    Objective     Vitals:    04/10/17 1401   BP: 138/78   Patient Site: Left Arm   Patient Position: Sitting   Cuff Size: Adult Large   Pulse: (!) 102   SpO2: 98%   Weight: 192 lb (87.1 kg)     Wt Readings from Last 3 Encounters:   04/10/17 192 lb (87.1 kg)   12/07/16 201 lb (91.2 kg)   03/21/16 188 lb 12 oz (85.6 kg)       Physical Exam:  GENERAL: Alert, well-appearing male  CV: Regular rate and rhythm without murmurs, rubs or gallops.  RESP: Lung sounds clear, symmetric excursion.   KNEE:  KNEE:   APPEARANCE is normal; no valgus deformity. PALPATION of: patella,  medial joint,  lateral joint line,  tibial tuberosity, posterior fossa,  distal femur / quad non tender; no fluid palpated. ROM: extension normal; flexion normal. STRENGTH: extension 4/5; flexion 4/5. SPECIAL TESTS: lateral collateral ligament intact; medial collateral ligament intact; ACL (Lachman's) intact; PCL (posterior drawer) intact; meniscal testing (Zoe's) normal. No Crepitus. GAIT: normal    Labs:    No results found for this or any previous visit (from the past 168 hour(s)).    Assessment & Plan   1. Patellofemoral pain syndrome:  Negative exam.  History consistent with patellofemoral pain.  Did advise on at-home stretching and strengthening exercises. Recommended PT. He will consider this if he does not have any improvement with home exercises.    - Ambulatory referral to Physical Therapy    2. Asthma, mild persistent:  Uncontrolled.  Increase Flovent to two puffs twice daily. Discussed indications of good control, KEYSHAWN use <2x/week.  Recheck in one month if symptoms remain uncontrolled.  Recommended smoking cessation, he declines discussion today.   - albuterol (VENTOLIN HFA) 90 mcg/actuation inhaler; Inhale 2 puffs every 6 (six) hours as needed for  wheezing.  Dispense: 1 Inhaler; Refill: 1    Josette Wright, CNP

## 2021-06-12 NOTE — TELEPHONE ENCOUNTER
Refill Approved    Rx renewed per Medication Renewal Policy. Medication was last renewed on 1/9/20.    Abbey Mackey, [facility] Connection Triage/Med Refill 10/9/2020     Requested Prescriptions   Pending Prescriptions Disp Refills     albuterol (VENTOLIN HFA) 90 mcg/actuation inhaler 3 Inhaler 2     Sig: Inhale 2 puffs every 6 (six) hours as needed for wheezing.       Albuterol/Levalbuterol Refill Protocol Passed - 10/7/2020  4:23 PM        Passed - PCP or prescribing provider visit in last year     Last office visit with prescriber/PCP: 4/10/2017 Josette Wright CNP OR same dept: 1/9/2020 Rohan Holguin MD OR same specialty: 5/18/2020 Audrey Loja MD Last physical: 7/16/2018       Next appt within 3 mo: Visit date not found  Next physical within 3 mo: Visit date not found  Prescriber OR PCP: Josette Wright CNP  Last diagnosis associated with med order: 1. Asthma, moderate persistent, poorly-controlled  - albuterol (VENTOLIN HFA) 90 mcg/actuation inhaler; Inhale 2 puffs every 6 (six) hours as needed for wheezing.  Dispense: 3 Inhaler; Refill: 2  - fluticasone propionate (FLOVENT HFA) 110 mcg/actuation inhaler; Inhale 2 puffs 2 (two) times a day.  Dispense: 1 Inhaler; Refill: 2    If protocol passes may refill for 6 months if within 3 months of last provider visit (or a total of 9 months). If patient requesting >1 inhaler per month refill x 6 months and have patient make appointment with provider.             fluticasone propionate (FLOVENT HFA) 110 mcg/actuation inhaler 1 Inhaler 2     Sig: Inhale 2 puffs 2 (two) times a day.       Asthma Medications Refill Protocol Passed - 10/7/2020  4:23 PM        Passed - PCP or prescribing provider visit in last year     Last office visit with prescriber/PCP: 4/10/2017 Josette Wright CNP OR same dept: 1/9/2020 Rohan Holguin MD OR same specialty: 5/18/2020 Audrey Loja MD  Last physical: 7/16/2018 Last MTM visit: Visit date not found    Next appt within 3  mo: Visit date not found Next physical within 3 mo: Visit date not found  Prescriber OR PCP: Josette Wright CNP  Last diagnosis associated with med order: 1. Asthma, moderate persistent, poorly-controlled  - albuterol (VENTOLIN HFA) 90 mcg/actuation inhaler; Inhale 2 puffs every 6 (six) hours as needed for wheezing.  Dispense: 3 Inhaler; Refill: 2  - fluticasone propionate (FLOVENT HFA) 110 mcg/actuation inhaler; Inhale 2 puffs 2 (two) times a day.  Dispense: 1 Inhaler; Refill: 2    If protocol passes may refill for 6 months if within 3 months of last provider visit (or a total of 9 months).

## 2021-06-13 NOTE — TELEPHONE ENCOUNTER
Refill request received by fax from [facility] for Ventolin inhaler. Please review and sign if appropriate.

## 2021-06-15 PROBLEM — J45.40 ASTHMA, MODERATE PERSISTENT, POORLY-CONTROLLED: Status: ACTIVE | Noted: 2017-04-10

## 2021-06-16 PROBLEM — F17.210 CIGARETTE NICOTINE DEPENDENCE WITHOUT COMPLICATION: Status: ACTIVE | Noted: 2020-01-09

## 2021-06-16 NOTE — TELEPHONE ENCOUNTER
RN cannot approve Refill Request    RN can NOT refill this medication Protocol failed and NO refill given. Last office visit: 4/10/2017 Josette Wright CNP Last Physical: 7/16/2018 Last MTM visit: Visit date not found Last visit same specialty: 5/18/2020 Audrey Loja MD.  Next visit within 3 mo: Visit date not found  Next physical within 3 mo: Visit date not found      Cary Hebert, [facility] Connection Triage/Med Refill 3/20/2021    Requested Prescriptions   Pending Prescriptions Disp Refills     FLOVENT  mcg/actuation inhaler [Pharmacy Med Name: FLOVENT  MCG ORAL INH 120INH] 1 Inhaler 0     Sig: INHALE 2 PUFFS BY MOUTH TWICE DAILY       There is no refill protocol information for this order

## 2021-06-17 NOTE — PATIENT INSTRUCTIONS - HE
Patient Instructions by Rohan Holguin MD at 1/9/2020  1:20 PM     Author: Rohan Holguin MD Service: -- Author Type: Physician    Filed: 1/9/2020  6:34 PM Encounter Date: 1/9/2020 Status: Signed    : Rohan Holguin MD (Physician)       Patient Education     Controlling Asthma Triggers: Allergens  For many people with lung problems such as asthma or COPD, breathing in allergens leads to inflamed airways. Allergens also cause other types of reactions in some people. For example, a runny nose, itchy, watery eyes, or a skin rash. Do your best to stay away from allergens that trigger symptoms. The tips below can help reduce any reaction you may have to certain allergens.    Dust mites  Dust mites are tiny bugs too small to see or feel. But they can be a major trigger for allergy and asthma symptoms. Dust mites live in mattresses, bedding, carpets, and upholstered furniture. They can be carried on indoor dust. They thrive in warm, moist environments.    Wash bedding in hot water (130 F/54.4 C) each week. This kills the dust mites.    Cover mattress and pillows with special dust-mite-proof (hypoallergenic) cases.    Dont use upholstered furniture such as sofas or chairs in the bedroom.    Use allergy-proof filters for air conditioners and furnaces. Follow product maker's instructions for maintaining and replacing filters.    If you can, replace [id number] carpets with wood, tile, or linoleum floors. This is very important in the bedroom.  Animals  Animals with fur or feathers often make allergens. These are shed as tiny particles called dander. Dander can float through the air. Or it can stick to carpet, clothing, and furniture.    Choose a pet that doesnt have fur or feathers. Examples are fish and reptiles.    Keep pets with fur or feathers out of your home. If you cant do this, keep them out of your bedroom. But keeping a pet out of your bedroom doesn't mean your bedroom is free  of pet allergens. If you sit on the couch in the living room and then go into your bedroom, you have brought the pet allergen there.    Wash your hands and clothes after handling pets.    Give your pet a bath each week to decrease the amount allergens.    Avoid contact with soiled litter cages as much as possible.    Mold  Mold grows in damp places, such as bathrooms, basements, and closets. It can grow anywhere flooding or a fire has caused water damage. Mold can live behind the walls if there has been water damage.    Clean damp areas weekly to prevent mold growth. This includes shower stalls and sinks. You may need someone to clean these areas for you. Or try wearing a mask.    Run an exhaust fan while bathing. Or leave a window or door open in the bathroom.    Repair water leaks in or around your home.    Have someone else cut grass or rake leaves, if possible.    Dont use vaporizers, or humidifiers. These put water into the air and encourage mold growth.    Pollen   Pollen from trees, grasses, and weeds is a common allergen. Flower pollens are generally not a problem.    Try to learn what types of pollen affect you most. Pollen levels vary depending on the plant, the season, and the time of day.    Use air conditioning instead of opening the windows in your home or car. In the car, choose the setting to recirculate the air, so less pollen gets in.    Have someone else do yard work, if possible.    Change clothes in a mudroom when you get home if you are highly allergic to pollens. This will keep most of the pollen from entering the house.    Cockroaches and mice  Cockroaches and mice are common household pests. They also produce allergens.    Keep your kitchen clean and dry. A leaky faucet or drain can attract roaches.    Remove garbage from your home daily.    Store food in tightly sealed containers. Wash dishes as soon as they are used.    Use bait stations or traps to control roaches. Don't use chemical  sprays.    Date Last Reviewed: 10/1/2016    4759-3361 [employer]. [address] [place] [address], [place] [address]. All rights reserved. This information is not intended as a substitute for professional medical care. Always follow your healthcare professional's instructions.

## 2021-06-19 NOTE — PROGRESS NOTES
MALE PREVENTIVE EXAM    Assessment & Plan   1. Routine general medical examination at a health care facility  - Hepatitis A adult 2 dose IM (19 yr & older)  - Td, Preservative Free (green label)    2. Elevated blood pressure reading without diagnosis of hypertension:  Discussed drinking is likely playing a large role in this and I expect this to improve as he continues to work on cessation.  In the meantime, recommended recheck and consideration of medication.  Advised on risks of uncontrolled blood pressure, even for a short period of time. He is very hesitant today. Does agree to think about it and will contact via [employer] with questions.   - Comprehensive Metabolic Panel  - Cholesterol, Total  - Thyroid Cascade    3. Alcohol abuse:  Continue to work with outpatient therapist.  Congratulated on decision to address this.    - Comprehensive Metabolic Panel    4. Asthma, moderate persistent, poorly-controlled:  Long discussion of markers of control and risks associated with uncontrolled asthma.  He is agreeable to me sending in a prescription for an ICS-LABA though is unsure if he is going to start this.  Did advise at the very least to have an active albuterol on hand.  If he does decide to start the Symbicort, recheck in one month.  Offered cessation counseling and he declines today.   - budesonide-formoterol (SYMBICORT) 160-4.5 mcg/actuation inhaler; Inhale 1 puff 2 (two) times a day.  Dispense: 1 Inhaler; Refill: 2  - albuterol (VENTOLIN HFA) 90 mcg/actuation inhaler; Inhale 2 puffs every 6 (six) hours as needed for wheezing.  Dispense: 3 Inhaler; Refill: 2    5. Hip flexor tendinitis, left:  I suspect hip flexor injury based on exam and timing of symptoms. Discussed stretching exercises. If this does not continue to improve, consider PT or imaging.      6. Screening for cholesterol level    7. Screening for diabetes mellitus  - Comprehensive Metabolic Panel    8. Screening for deficiency anemia  - HM2(CBC w/o  Differential)    Alcohol use, safety and moderation discussed.  Recommended adequate calcium intake/osteoporosis prevention.  Discussed colon cancer screening at age 50, 45 if -American.  Diet discuss, including moderation of portions sizes, avoiding eating out and fast food and increase in fruits and vegetables.  Discussed safe sex practices.  Discussed & recommended seat belt (& motorcycle helmet) use.      Josette Wright CNP    Subjective:   Chief Complaint:  Establish Care; Annual Exam (fasting); and Hip Pain (L hip pain starting about 1 1/2 months ago, pain comes and goes; his job he has to be on his feet all day)    HPI:  Faustino Power is a 28 y.o. male who presents for routine physical exam.    Left hip pain:  Began six weeks ago.  Initially improving gradually though one week ago worsened acutely again.  At this time was on vacation and walking long distances in the airport. No other increased activity. Is a  at [employer] and works long hours on his feet.  Pain is located in the front and side of the hip. No pain in groin area.  Present only with walking.  Not present with standing still or resting.  No numbness/tingling.  No instability.      Alcohol abuse:  Describes self as 'heavy drinker'.  Currently four drinks/day, previously heavier.  He states he is working with an outpatient program at [email or web address] . Things are going well. His counselor suggested scheduling a physical to check liver enzymes.     BP:  Elevated at 140/88, 154/88 on recheck.  Previous elevations though most recent in hypertensive range three years ago at 154/96.  Does not exercise. Admits diet is poor.     Asthma:  Daily wheezing and shortness of breath.  Does not use albuterol. Is out and states he does not feel he 'needs' this.  Eventually the wheezing subsides. He states 'I smoke heavily. This is expected.'  Previously on daily ICS. He cannot recall if this improved daily symptoms.  No interest in  "discussing cessation at this time.     Preventive Health:  Reviewed and recommended screening and treatment recommendations:  Immunizations: Due for td    Health Habits:    Exercise: no  Balanced Diet: no  Seat Belt Use: YES  Calcium intake/Osteoporosis prevention: no  Guns: NO  Sun Screen: YES  Dental Care: YES    PMH:   Patient Active Problem List   Diagnosis     Paronychia of finger     Asthma, mild persistent       No past medical history on file.    Current Medications: Reviewed  Current Outpatient Prescriptions on File Prior to Visit   Medication Sig Dispense Refill     albuterol (VENTOLIN HFA) 90 mcg/actuation inhaler Inhale 2 puffs every 6 (six) hours as needed for wheezing. 3 Inhaler 2     beclomethasone (QVAR) 80 mcg/actuation inhaler Inhale 2 puffs 2 (two) times a day. 1 Inhaler 2     Current Facility-Administered Medications on File Prior to Visit   Medication Dose Route Frequency Provider Last Rate Last Dose     cefTRIAXone injection 250 mg (ROCEPHIN)  250 mg Intramuscular Q24H Hannah Muller, DO   250 mg at 12/07/16 1115       Allergies: Reviewed   has No Known Allergies.    Social History:  Social History     Occupational History     Not on file.     Social History Main Topics     Smoking status: Current Every Day Smoker     Packs/day: 1.00     Smokeless tobacco: Never Used     Alcohol use 16.8 oz/week     14 Glasses of wine, 14 Cans of beer per week     Drug use: Not on file     Sexual activity: Not on file       Family History:   No family history on file.      Review of Systems:  Complete head to toe review of systems is otherwise negative except as above.    Objective:    /88 (Patient Site: Right Arm, Patient Position: Sitting, Cuff Size: Adult Large)  Pulse (!) 102  Ht 5' 7\" (1.702 m)  Wt 190 lb 8 oz (86.4 kg)  SpO2 96%  BMI 29.84 kg/m2    GENERAL: Alert, well-appearing male.   PSYCH: Pleasant mood, affect appropriate.  Good judgment and insight.  Intact recent and remote memory. Good " eye contact.  SKIN: No atypical lesions  EYES: Conjunctiva pink, sclera white, no exudates. PARISH.  EOMs intact. Corneal light reflex bilaterally, red reflex present. Undilated fundoscopic exam normal  EARS: TMs pearly grey, no bulging, redness, retraction.   MOUTH: Pharynx moist, pink without exudate. No tonsillar enlargement  NECK: No lymphadenopathy. Thyroid borders smooth without enlargement, nodules.   CV: Regular rate and rhythm without murmurs, rubs or gallops.  RESP: Lung sounds clear, symmetric excursion.   ABDOMEN: BS+. Abdomen soft.  No organomegaly, masses or hernias  :  Normal scrotum.  Testes descended bilaterally without mass or hernia. Penis circumcised without lesions or discharge.  PV : No edema  MSK:  Hips in alignment. Normal gait.  Full AROM, PROM. Pain with flexion. Positive RYDER. No pain with resisted movements of abduction, adduction.

## 2021-06-20 NOTE — LETTER
Letter by Rohan Holguin MD at      Author: Rohan Holguin MD Service: -- Author Type: --    Filed:  Encounter Date: 1/9/2020 Status: Signed       My Asthma Action Plan    Name: Faustino Power   YOB: 1990  Date: 1/9/2020   My doctor: Rohan Holguin MD   My clinic: [facility]        My Control Medicine: Fluticasone propionate (Flovent HFA) - 110 mcg 2 puffs bid  My Rescue Medicine: Albuterol (Proair/Ventolin/Proventil HFA) 2-4 puffs EVERY 4 HOURS as needed. Use a spacer if recommended by your provider.   My Oral Steroid Medicine: prednisone as directed My Asthma Severity:   Mild Persistent  Know your asthma triggers: smoke, animal dander and cold air               GREEN ZONE   Good Control    I feel good    No cough or wheeze    Can work, sleep and play without asthma symptoms     Take your asthma control medicine every day.     1. If exercise triggers your asthma, take your rescue medication    15 minutes before exercise or sports, and    During exercise if you have asthma symptoms  2. Spacer to use with inhaler: If you have a spacer, make sure to use it with your inhaler             YELLOW ZONE Getting Worse  I have ANY of these:    I do not feel good    Cough or wheeze    Chest feels tight    Wake up at night 1. Keep taking your Green Zone medications  2. Start taking your rescue medicine:    every 20 minutes for up to 1 hour. Then every 4 hours for 24-48 hours.  3. If you stay in the Yellow Zone for more than 12-24 hours, contact your doctor.  4. If you do not return to the Green Zone in 12-24 hours or you get worse, start taking your oral steroid medicine if prescribed by your provider.           RED ZONE Medical Alert - Get Help  I have ANY of these:    I feel awful    Medicine is not helping    Breathing getting harder    Trouble walking or talking    Nose opens wide to breathe     1. Take your rescue medicine NOW  2. If your provider  has prescribed an oral steroid medicine, start taking it NOW  3. Call your doctor NOW  4. If you are still in the Red Zone after 20 minutes and you have not reached your doctor:    Take your rescue medicine again and    Call 911 or go to the emergency room right away    See your regular doctor within 2 weeks of an Emergency Room or Urgent Care visit for follow-up treatment.          Annual Reminders:  Meet with Asthma Educator,  Flu Shot in the Fall, consider Pneumonia Vaccination for patients with asthma (aged 19 and older).    Pharmacy:   [id number] - [place], [place] - [address] [place] [address]  Phone: [phone] Fax: [phone]      Electronically signed by Rohan Holguin MD   Date: 01/09/20                    Asthma Triggers  How To Control Things That Make Your Asthma Worse    Triggers are things that make your asthma worse.  Look at the list below to help you find your triggers and what you can do about them.  You can help prevent asthma flare-ups by staying away from your triggers.      Trigger                                                          What you can do   Cigarette Smoke  Tobacco smoke can make asthma worse. Do not allow smoking in your home, car or around you.  Be sure no one smokes at a [employer] day care or school.  If you smoke, ask your health care provider for ways to help you quit.  Ask family members to quit too.  Ask your health care provider for a referral to Quit Plan to help you quit smoking, or call 9-616-180-PLAN.     Colds, Flu, Bronchitis  These are common triggers of asthma. Wash your hands often.  Dont touch your eyes, nose or mouth.  Get a flu shot every year.     Dust Mites  These are tiny bugs that live in cloth or carpet. They are too small to see. Wash sheets and blankets in hot water every week.   Encase pillows and mattress in dust mite proof covers.  Avoid having carpet if you can. If  you have carpet, vacuum weekly.   Use a dust mask and HEPA vacuum.   Pollen and Outdoor Mold  Some people are allergic to trees, grass, or weed pollen, or molds. Try to keep your windows closed.  Limit time out doors when pollen count is high.   Ask you health care provider about taking medicine during allergy season.     Animal Dander  Some people are allergic to skin flakes, urine or saliva from pets with fur or feathers. Keep pets with fur or feathers out of your home.    If you cant keep the pet outdoors, then keep the pet out of your bedroom.  Keep the bedroom door closed.  Keep pets off cloth furniture and away from stuffed toys.     Mice, Rats, and Cockroaches   Some people are allergic to the waste from these pests.   Cover food and garbage.  Clean up spills and food crumbs.  Store grease in the refrigerator.   Keep food out of the bedroom.   Indoor Mold  This can be a trigger if your home has high moisture. Fix leaking faucets, pipes, or other sources of water.   Clean moldy surfaces.  Dehumidify basement if it is damp and smelly.   Smoke, Strong Odors, and Sprays  These can reduce air quality. Stay away from strong odors and sprays, such as perfume, powder, hair spray, paints, smoke incense, paint, cleaning products, candles and new carpet.   Exercise or Sports  Some people with asthma have this trigger. Be active!  Ask your doctor about taking medicine before sports or exercise to prevent symptoms.    Warm up for 5-10 minutes before and after sports or exercise.     Other Triggers of Asthma  Cold air:  Cover your nose and mouth with a scarf.  Sometimes laughing or crying can be a trigger.  Some medicines and food can trigger asthma.

## 2021-06-26 ENCOUNTER — HEALTH MAINTENANCE LETTER (OUTPATIENT)
Age: 31
End: 2021-06-26

## 2021-07-03 NOTE — ADDENDUM NOTE
Addendum Note by Osmin Valadez CNP at 6/18/2017 10:39 AM     Author: Osmin Valadez CNP Service: -- Author Type: Nurse Practitioner    Filed: 6/18/2017 10:39 AM Encounter Date: 6/17/2017 Status: Signed    : Osmin Valadez CNP (Nurse Practitioner)    Addended by: OSMIN VALADEZ on: 6/18/2017 10:39 AM        Modules accepted: Orders

## 2021-07-03 NOTE — ADDENDUM NOTE
Addendum Note by Barby Moore at 7/16/2018  5:22 PM     Author: Barby Moore Service: -- Author Type:     Filed: 7/16/2018  5:22 PM Encounter Date: 7/16/2018 Status: Signed    : Barby Moore ()    Addended by: BARBY MOORE on: 7/16/2018 05:22 PM        Modules accepted: Orders

## 2021-09-15 ENCOUNTER — MYC REFILL (OUTPATIENT)
Dept: FAMILY MEDICINE | Facility: CLINIC | Age: 31
End: 2021-09-15

## 2021-09-15 DIAGNOSIS — J45.40 ASTHMA, MODERATE PERSISTENT, POORLY-CONTROLLED: ICD-10-CM

## 2021-09-15 RX ORDER — ALBUTEROL SULFATE 90 UG/1
2 AEROSOL, METERED RESPIRATORY (INHALATION) EVERY 6 HOURS PRN
Qty: 18 G | Refills: 1 | Status: SHIPPED | OUTPATIENT
Start: 2021-09-15 | End: 2021-12-22

## 2021-09-16 NOTE — TELEPHONE ENCOUNTER
"Routing refill request to provider for review/approval because:  Labs not current:  ACT  OV last > 6 months    Last Written Prescription Date:  11/30/20  Last Fill Quantity: 3,  # refills: 2   Last office visit provider:  5/18/20     Requested Prescriptions   Pending Prescriptions Disp Refills     albuterol (PROAIR HFA/PROVENTIL HFA/VENTOLIN HFA) 108 (90 Base) MCG/ACT inhaler       Sig: Inhale 2 puffs into the lungs every 6 hours as needed       Asthma Maintenance Inhalers - Anticholinergics Failed - 9/15/2021  9:31 AM        Failed - Asthma control assessment score within normal limits in last 6 months     Please review ACT score.           Failed - Recent (6 mo) or future (30 days) visit within the authorizing provider's specialty     Patient had office visit in the last 6 months or has a visit in the next 30 days with authorizing provider or within the authorizing provider's specialty.  See \"Patient Info\" tab in inbasket, or \"Choose Columns\" in Meds & Orders section of the refill encounter.            Passed - Patient is age 12 years or older        Passed - Medication is active on med list       Short-Acting Beta Agonist Inhalers Protocol  Failed - 9/15/2021  9:31 AM        Failed - Asthma control assessment score within normal limits in last 6 months     Please review ACT score.           Failed - Recent (6 mo) or future (30 days) visit within the authorizing provider's specialty     Patient had office visit in the last 6 months or has a visit in the next 30 days with authorizing provider or within the authorizing provider's specialty.  See \"Patient Info\" tab in inbasket, or \"Choose Columns\" in Meds & Orders section of the refill encounter.            Passed - Patient is age 12 or older        Passed - Medication is active on med list             yariel adams RN 09/15/21 7:47 PM  "

## 2021-10-16 ENCOUNTER — HEALTH MAINTENANCE LETTER (OUTPATIENT)
Age: 31
End: 2021-10-16

## 2021-12-19 DIAGNOSIS — J45.40 ASTHMA, MODERATE PERSISTENT, POORLY-CONTROLLED: ICD-10-CM

## 2021-12-22 RX ORDER — ALBUTEROL SULFATE 90 UG/1
2 AEROSOL, METERED RESPIRATORY (INHALATION) EVERY 6 HOURS PRN
Qty: 18 G | Refills: 1 | Status: SHIPPED | OUTPATIENT
Start: 2021-12-22 | End: 2022-04-01

## 2021-12-22 NOTE — TELEPHONE ENCOUNTER
"Routing refill request to provider for review/approval because:  Patient needs to be seen because it has been more than 1 year since last office visit.  ACT score    Last Written Prescription Date:  9/15/2021  Last Fill Quantity: 18g,  # refills: 1   Last office visit provider:  5/18/2020     Requested Prescriptions   Pending Prescriptions Disp Refills     albuterol (PROAIR HFA/PROVENTIL HFA/VENTOLIN HFA) 108 (90 Base) MCG/ACT inhaler [Pharmacy Med Name: ALBUTEROL HFA INH(200 PUFFS)18GM] 18 g 1     Sig: INHALE 2 PUFFS INTO THE LUNGS EVERY 6 HOURS AS NEEDED       Asthma Maintenance Inhalers - Anticholinergics Failed - 12/19/2021 11:40 PM        Failed - Asthma control assessment score within normal limits in last 6 months     Please review ACT score.           Failed - Recent (6 mo) or future (30 days) visit within the authorizing provider's specialty     Patient had office visit in the last 6 months or has a visit in the next 30 days with authorizing provider or within the authorizing provider's specialty.  See \"Patient Info\" tab in inbasket, or \"Choose Columns\" in Meds & Orders section of the refill encounter.            Passed - Patient is age 12 years or older        Passed - Medication is active on med list       Short-Acting Beta Agonist Inhalers Protocol  Failed - 12/19/2021 11:40 PM        Failed - Asthma control assessment score within normal limits in last 6 months     Please review ACT score.           Failed - Recent (6 mo) or future (30 days) visit within the authorizing provider's specialty     Patient had office visit in the last 6 months or has a visit in the next 30 days with authorizing provider or within the authorizing provider's specialty.  See \"Patient Info\" tab in inbasket, or \"Choose Columns\" in Meds & Orders section of the refill encounter.            Passed - Patient is age 12 or older        Passed - Medication is active on med list             Edith Jarquin RN 12/21/21 9:53 PM  "

## 2022-03-29 DIAGNOSIS — J45.40 ASTHMA, MODERATE PERSISTENT, POORLY-CONTROLLED: ICD-10-CM

## 2022-04-01 RX ORDER — ALBUTEROL SULFATE 90 UG/1
2 AEROSOL, METERED RESPIRATORY (INHALATION) EVERY 6 HOURS PRN
Qty: 18 G | Refills: 1 | Status: SHIPPED | OUTPATIENT
Start: 2022-04-01 | End: 2022-08-05

## 2022-04-01 NOTE — TELEPHONE ENCOUNTER
"Routing refill request to provider for review/approval because:  ACT score out of date.  Patient needs to be seen because it has been more than 1 year since last office visit.    Last Written Prescription Date:  12/22/21  Last Fill Quantity: 18 g,  # refills: 1   Last office visit provider:  5/18/20     Requested Prescriptions   Pending Prescriptions Disp Refills     albuterol (PROAIR HFA/PROVENTIL HFA/VENTOLIN HFA) 108 (90 Base) MCG/ACT inhaler [Pharmacy Med Name: ALBUTEROL HFA INH(200 PUFFS)18GM] 18 g 1     Sig: INHALE 2 PUFFS INTO THE LUNGS EVERY 6 HOURS AS NEEDED       Asthma Maintenance Inhalers - Anticholinergics Failed - 4/1/2022  8:49 AM        Failed - Asthma control assessment score within normal limits in last 6 months     Please review ACT score.           Failed - Recent (6 mo) or future (30 days) visit within the authorizing provider's specialty     Patient had office visit in the last 6 months or has a visit in the next 30 days with authorizing provider or within the authorizing provider's specialty.  See \"Patient Info\" tab in inbasket, or \"Choose Columns\" in Meds & Orders section of the refill encounter.            Passed - Patient is age 12 years or older        Passed - Medication is active on med list       Short-Acting Beta Agonist Inhalers Protocol  Failed - 4/1/2022  8:49 AM        Failed - Asthma control assessment score within normal limits in last 6 months     Please review ACT score.           Failed - Recent (6 mo) or future (30 days) visit within the authorizing provider's specialty     Patient had office visit in the last 6 months or has a visit in the next 30 days with authorizing provider or within the authorizing provider's specialty.  See \"Patient Info\" tab in inbasket, or \"Choose Columns\" in Meds & Orders section of the refill encounter.            Passed - Patient is age 12 or older        Passed - Medication is active on med list             Asael Franks RN 04/01/22 8:49 AM  "

## 2022-07-23 ENCOUNTER — HEALTH MAINTENANCE LETTER (OUTPATIENT)
Age: 32
End: 2022-07-23

## 2022-08-01 ENCOUNTER — MYC REFILL (OUTPATIENT)
Dept: FAMILY MEDICINE | Facility: CLINIC | Age: 32
End: 2022-08-01

## 2022-08-01 DIAGNOSIS — J45.40 ASTHMA, MODERATE PERSISTENT, POORLY-CONTROLLED: ICD-10-CM

## 2022-08-02 NOTE — TELEPHONE ENCOUNTER
"Former patient of [facility] & has not established care with another provider.  Please assign refill request to covering provider per clinic standard process.      Routing refill request to provider for review/approval because:  act    Last Written Prescription Date:  3/5/22  Last Fill Quantity: 12,  # refills: 0   Last office visit provider:  5/18/20     Requested Prescriptions   Pending Prescriptions Disp Refills     fluticasone (FLOVENT HFA) 110 MCG/ACT inhaler 12 g 0     Sig: Inhale 1 puff into the lungs 2 times daily NEEDS MED CHECk       Inhaled Steroids Protocol Failed - 8/1/2022 12:54 PM        Failed - Asthma control assessment score within normal limits in last 6 months     Please review ACT score.           Failed - Recent (6 mo) or future (30 days) visit within the authorizing provider's specialty     Patient had office visit in the last 6 months or has a visit in the next 30 days with authorizing provider or within the authorizing provider's specialty.  See \"Patient Info\" tab in inbasket, or \"Choose Columns\" in Meds & Orders section of the refill encounter.            Passed - Patient is age 12 or older        Passed - Medication is active on med list             Abbey Mackey RN 08/02/22 2:35 PM  "

## 2022-08-03 RX ORDER — FLUTICASONE PROPIONATE 110 UG/1
1 AEROSOL, METERED RESPIRATORY (INHALATION) 2 TIMES DAILY
Qty: 12 G | Refills: 0 | Status: SHIPPED | OUTPATIENT
Start: 2022-08-03 | End: 2022-10-03

## 2022-08-05 DIAGNOSIS — J45.40 ASTHMA, MODERATE PERSISTENT, POORLY-CONTROLLED: ICD-10-CM

## 2022-08-06 NOTE — TELEPHONE ENCOUNTER
"Routing refill request to provider for review/approval because:  Act  Due to be seen    Last Written Prescription Date:  4/1/22  Last Fill Quantity: 18,  # refills: 1   Last office visit provider:  5/18/20     Requested Prescriptions   Pending Prescriptions Disp Refills     albuterol (PROAIR HFA/PROVENTIL HFA/VENTOLIN HFA) 108 (90 Base) MCG/ACT inhaler 18 g 1     Sig: Inhale 2 puffs into the lungs every 6 hours as needed       Asthma Maintenance Inhalers - Anticholinergics Failed - 8/5/2022 12:25 PM        Failed - Asthma control assessment score within normal limits in last 6 months     Please review ACT score.           Failed - Recent (6 mo) or future (30 days) visit within the authorizing provider's specialty     Patient had office visit in the last 6 months or has a visit in the next 30 days with authorizing provider or within the authorizing provider's specialty.  See \"Patient Info\" tab in inbasket, or \"Choose Columns\" in Meds & Orders section of the refill encounter.            Passed - Patient is age 12 years or older        Passed - Medication is active on med list       Short-Acting Beta Agonist Inhalers Protocol  Failed - 8/5/2022 12:25 PM        Failed - Asthma control assessment score within normal limits in last 6 months     Please review ACT score.           Failed - Recent (6 mo) or future (30 days) visit within the authorizing provider's specialty     Patient had office visit in the last 6 months or has a visit in the next 30 days with authorizing provider or within the authorizing provider's specialty.  See \"Patient Info\" tab in inbasket, or \"Choose Columns\" in Meds & Orders section of the refill encounter.            Passed - Patient is age 12 or older        Passed - Medication is active on med list             Abbey Mackey RN 08/05/22 9:03 PM  "

## 2022-08-08 NOTE — TELEPHONE ENCOUNTER
Attempted to call pt, no answer (1/3). Left message requesting pt to call back clinic.     Pt has not been seen by a [place] primary care provider in over 2 years. Pt will need to have med check appointment scheduled for this medication refill.    Please also call pt to advise that he needs to make an establish care visit with a new PCP who is accepting new pts.

## 2022-08-12 DIAGNOSIS — J45.40 ASTHMA, MODERATE PERSISTENT, POORLY-CONTROLLED: ICD-10-CM

## 2022-08-12 RX ORDER — ALBUTEROL SULFATE 90 UG/1
2 AEROSOL, METERED RESPIRATORY (INHALATION) EVERY 6 HOURS PRN
Qty: 18 G | Refills: 1 | Status: SHIPPED | OUTPATIENT
Start: 2022-08-12 | End: 2023-05-08

## 2022-08-13 RX ORDER — ALBUTEROL SULFATE 90 UG/1
2 AEROSOL, METERED RESPIRATORY (INHALATION) EVERY 6 HOURS PRN
Qty: 54 G | OUTPATIENT
Start: 2022-08-13

## 2022-09-29 DIAGNOSIS — J45.40 ASTHMA, MODERATE PERSISTENT, POORLY-CONTROLLED: ICD-10-CM

## 2022-09-30 NOTE — TELEPHONE ENCOUNTER
"Routing refill request to provider for review/approval because:  Asthma control assessment score and patient needs to be seen    Last Written Prescription Date:  8/3/2022  Last Fill Quantity: 12g,  # refills: 0   Last office visit provider:  5/18/2020     Requested Prescriptions   Pending Prescriptions Disp Refills     FLOVENT  MCG/ACT inhaler [Pharmacy Med Name: FLOVENT HFA 110MCG ORAL INH 120INH] 12 g 0     Sig: INHALE 1 PUFF INTO THE LUNGS TWICE DAILY       Inhaled Steroids Protocol Failed - 9/29/2022 11:47 AM        Failed - Asthma control assessment score within normal limits in last 6 months     Please review ACT score.           Failed - Recent (6 mo) or future (30 days) visit within the authorizing provider's specialty     Patient had office visit in the last 6 months or has a visit in the next 30 days with authorizing provider or within the authorizing provider's specialty.  See \"Patient Info\" tab in inbasket, or \"Choose Columns\" in Meds & Orders section of the refill encounter.            Passed - Patient is age 12 or older        Passed - Medication is active on med list             Simi Camp RN 09/29/22 9:16 PM  "

## 2022-10-01 ENCOUNTER — HEALTH MAINTENANCE LETTER (OUTPATIENT)
Age: 32
End: 2022-10-01

## 2022-10-03 RX ORDER — DEXAMETHASONE 4 MG/1
TABLET ORAL
Qty: 12 G | Refills: 0 | Status: SHIPPED | OUTPATIENT
Start: 2022-10-03 | End: 2023-05-08

## 2023-01-03 ENCOUNTER — TELEPHONE (OUTPATIENT)
Dept: INTERNAL MEDICINE | Facility: CLINIC | Age: 33
End: 2023-01-03

## 2023-01-03 NOTE — TELEPHONE ENCOUNTER
I called and left a detailed message for patient to return call to arrange a New Patient visit with Dr. Kelly.     IF patient returns call, please arrange a New Patient visit for 40 minutes visit, thank you.

## 2023-05-08 ENCOUNTER — OFFICE VISIT (OUTPATIENT)
Dept: INTERNAL MEDICINE | Facility: CLINIC | Age: 33
End: 2023-05-08
Payer: COMMERCIAL

## 2023-05-08 VITALS
RESPIRATION RATE: 18 BRPM | WEIGHT: 168 LBS | HEIGHT: 68 IN | DIASTOLIC BLOOD PRESSURE: 80 MMHG | BODY MASS INDEX: 25.46 KG/M2 | OXYGEN SATURATION: 98 % | TEMPERATURE: 98 F | HEART RATE: 100 BPM | SYSTOLIC BLOOD PRESSURE: 130 MMHG

## 2023-05-08 DIAGNOSIS — R35.0 URINARY FREQUENCY: ICD-10-CM

## 2023-05-08 DIAGNOSIS — E78.00 HYPERCHOLESTEROLEMIA: ICD-10-CM

## 2023-05-08 DIAGNOSIS — R19.5 LOOSE STOOLS: ICD-10-CM

## 2023-05-08 DIAGNOSIS — R10.84 ABDOMINAL PAIN, GENERALIZED: ICD-10-CM

## 2023-05-08 DIAGNOSIS — Z11.3 ROUTINE SCREENING FOR STI (SEXUALLY TRANSMITTED INFECTION): ICD-10-CM

## 2023-05-08 DIAGNOSIS — D75.89 MACROCYTOSIS WITHOUT ANEMIA: ICD-10-CM

## 2023-05-08 DIAGNOSIS — M25.50 ARTHRALGIA, UNSPECIFIED JOINT: ICD-10-CM

## 2023-05-08 DIAGNOSIS — J45.40 ASTHMA, MODERATE PERSISTENT, POORLY-CONTROLLED: ICD-10-CM

## 2023-05-08 DIAGNOSIS — N52.9 ERECTILE DYSFUNCTION, UNSPECIFIED ERECTILE DYSFUNCTION TYPE: ICD-10-CM

## 2023-05-08 DIAGNOSIS — L40.9 PSORIASIS: ICD-10-CM

## 2023-05-08 DIAGNOSIS — R31.29 MICROSCOPIC HEMATURIA: ICD-10-CM

## 2023-05-08 DIAGNOSIS — F10.20 UNCOMPLICATED ALCOHOL DEPENDENCE (H): ICD-10-CM

## 2023-05-08 DIAGNOSIS — Z00.00 HEALTH CARE MAINTENANCE: Primary | ICD-10-CM

## 2023-05-08 LAB
ALBUMIN SERPL BCG-MCNC: 4.7 G/DL (ref 3.5–5.2)
ALBUMIN UR-MCNC: 100 MG/DL
ALP SERPL-CCNC: 107 U/L (ref 40–129)
ALT SERPL W P-5'-P-CCNC: 73 U/L (ref 10–50)
ANION GAP SERPL CALCULATED.3IONS-SCNC: 16 MMOL/L (ref 7–15)
APPEARANCE UR: CLEAR
AST SERPL W P-5'-P-CCNC: 85 U/L (ref 10–50)
BACTERIA #/AREA URNS HPF: ABNORMAL /HPF
BILIRUB SERPL-MCNC: 0.4 MG/DL
BILIRUB UR QL STRIP: ABNORMAL
BUN SERPL-MCNC: 9.4 MG/DL (ref 6–20)
CALCIUM SERPL-MCNC: 9.4 MG/DL (ref 8.6–10)
CHLORIDE SERPL-SCNC: 103 MMOL/L (ref 98–107)
CHOLEST SERPL-MCNC: 252 MG/DL
COLOR UR AUTO: YELLOW
CREAT SERPL-MCNC: 0.82 MG/DL (ref 0.67–1.17)
DEPRECATED HCO3 PLAS-SCNC: 24 MMOL/L (ref 22–29)
ERYTHROCYTE [DISTWIDTH] IN BLOOD BY AUTOMATED COUNT: 12.2 % (ref 10–15)
GFR SERPL CREATININE-BSD FRML MDRD: >90 ML/MIN/1.73M2
GLUCOSE SERPL-MCNC: 83 MG/DL (ref 70–99)
GLUCOSE UR STRIP-MCNC: NEGATIVE MG/DL
HCT VFR BLD AUTO: 48 % (ref 40–53)
HDLC SERPL-MCNC: 112 MG/DL
HGB BLD-MCNC: 16.3 G/DL (ref 13.3–17.7)
HGB UR QL STRIP: ABNORMAL
KETONES UR STRIP-MCNC: ABNORMAL MG/DL
LDLC SERPL CALC-MCNC: 123 MG/DL
LEUKOCYTE ESTERASE UR QL STRIP: NEGATIVE
MCH RBC QN AUTO: 35.9 PG (ref 26.5–33)
MCHC RBC AUTO-ENTMCNC: 34 G/DL (ref 31.5–36.5)
MCV RBC AUTO: 106 FL (ref 78–100)
MUCOUS THREADS #/AREA URNS LPF: PRESENT /LPF
NITRATE UR QL: NEGATIVE
NONHDLC SERPL-MCNC: 140 MG/DL
PH UR STRIP: 6 [PH] (ref 5–8)
PLATELET # BLD AUTO: 196 10E3/UL (ref 150–450)
POTASSIUM SERPL-SCNC: 4.2 MMOL/L (ref 3.4–5.3)
PROT SERPL-MCNC: 7.5 G/DL (ref 6.4–8.3)
PSA SERPL DL<=0.01 NG/ML-MCNC: 0.47 NG/ML
RBC # BLD AUTO: 4.54 10E6/UL (ref 4.4–5.9)
RBC #/AREA URNS AUTO: ABNORMAL /HPF
SODIUM SERPL-SCNC: 143 MMOL/L (ref 136–145)
SP GR UR STRIP: >=1.03 (ref 1–1.03)
SQUAMOUS #/AREA URNS AUTO: ABNORMAL /LPF
TRIGL SERPL-MCNC: 85 MG/DL
UROBILINOGEN UR STRIP-ACNC: 0.2 E.U./DL
WBC # BLD AUTO: 5.3 10E3/UL (ref 4–11)
WBC #/AREA URNS AUTO: ABNORMAL /HPF

## 2023-05-08 PROCEDURE — 90471 IMMUNIZATION ADMIN: CPT | Performed by: INTERNAL MEDICINE

## 2023-05-08 PROCEDURE — 99204 OFFICE O/P NEW MOD 45 MIN: CPT | Mod: 25 | Performed by: INTERNAL MEDICINE

## 2023-05-08 PROCEDURE — 81001 URINALYSIS AUTO W/SCOPE: CPT | Performed by: INTERNAL MEDICINE

## 2023-05-08 PROCEDURE — 80061 LIPID PANEL: CPT | Performed by: INTERNAL MEDICINE

## 2023-05-08 PROCEDURE — 99385 PREV VISIT NEW AGE 18-39: CPT | Mod: 25 | Performed by: INTERNAL MEDICINE

## 2023-05-08 PROCEDURE — 87591 N.GONORRHOEAE DNA AMP PROB: CPT | Performed by: INTERNAL MEDICINE

## 2023-05-08 PROCEDURE — 87491 CHLMYD TRACH DNA AMP PROBE: CPT | Performed by: INTERNAL MEDICINE

## 2023-05-08 PROCEDURE — 90677 PCV20 VACCINE IM: CPT | Performed by: INTERNAL MEDICINE

## 2023-05-08 PROCEDURE — 86803 HEPATITIS C AB TEST: CPT | Performed by: INTERNAL MEDICINE

## 2023-05-08 PROCEDURE — 80053 COMPREHEN METABOLIC PANEL: CPT | Performed by: INTERNAL MEDICINE

## 2023-05-08 PROCEDURE — 85027 COMPLETE CBC AUTOMATED: CPT | Performed by: INTERNAL MEDICINE

## 2023-05-08 PROCEDURE — 84153 ASSAY OF PSA TOTAL: CPT | Performed by: INTERNAL MEDICINE

## 2023-05-08 PROCEDURE — 87389 HIV-1 AG W/HIV-1&-2 AB AG IA: CPT | Performed by: INTERNAL MEDICINE

## 2023-05-08 PROCEDURE — 36415 COLL VENOUS BLD VENIPUNCTURE: CPT | Performed by: INTERNAL MEDICINE

## 2023-05-08 RX ORDER — FLUTICASONE PROPIONATE 110 UG/1
1 AEROSOL, METERED RESPIRATORY (INHALATION) 2 TIMES DAILY
Qty: 12 G | Refills: 3 | Status: SHIPPED | OUTPATIENT
Start: 2023-05-08 | End: 2023-07-19

## 2023-05-08 RX ORDER — ALBUTEROL SULFATE 90 UG/1
2 AEROSOL, METERED RESPIRATORY (INHALATION) EVERY 6 HOURS PRN
Qty: 18 G | Refills: 3 | Status: SHIPPED | OUTPATIENT
Start: 2023-05-08 | End: 2024-05-14

## 2023-05-08 ASSESSMENT — ENCOUNTER SYMPTOMS
DYSURIA: 0
HEARTBURN: 0
HEADACHES: 0
CONSTIPATION: 0
PALPITATIONS: 0
NERVOUS/ANXIOUS: 0
FEVER: 0
HEMATOCHEZIA: 0
FREQUENCY: 1
SHORTNESS OF BREATH: 1
CHILLS: 0
WEAKNESS: 0
PARESTHESIAS: 0
HEMATURIA: 0
NAUSEA: 0
DIZZINESS: 0
EYE PAIN: 0
ABDOMINAL PAIN: 1
JOINT SWELLING: 0
MYALGIAS: 0
COUGH: 1
ARTHRALGIAS: 1
SORE THROAT: 1
DIARRHEA: 1

## 2023-05-08 ASSESSMENT — ASTHMA QUESTIONNAIRES
QUESTION_2 LAST FOUR WEEKS HOW OFTEN HAVE YOU HAD SHORTNESS OF BREATH: ONCE A DAY
ACT_TOTALSCORE: 14
QUESTION_3 LAST FOUR WEEKS HOW OFTEN DID YOUR ASTHMA SYMPTOMS (WHEEZING, COUGHING, SHORTNESS OF BREATH, CHEST TIGHTNESS OR PAIN) WAKE YOU UP AT NIGHT OR EARLIER THAN USUAL IN THE MORNING: ONCE A WEEK
QUESTION_1 LAST FOUR WEEKS HOW MUCH OF THE TIME DID YOUR ASTHMA KEEP YOU FROM GETTING AS MUCH DONE AT WORK, SCHOOL OR AT HOME: NONE OF THE TIME
QUESTION_4 LAST FOUR WEEKS HOW OFTEN HAVE YOU USED YOUR RESCUE INHALER OR NEBULIZER MEDICATION (SUCH AS ALBUTEROL): ONE OR TWO TIMES PER DAY
QUESTION_5 LAST FOUR WEEKS HOW WOULD YOU RATE YOUR ASTHMA CONTROL: POORLY CONTROLLED
ACT_TOTALSCORE: 14

## 2023-05-08 NOTE — PROGRESS NOTES
1. Health care maintenance  Prevnar given today.  I discussed with patient the best thing he can do today would be to give up the alcohol and the smoking.  I am particularly concerned about the alcohol.  I am concerned it may be causing him irreparable damage at this point.  He is resistant to meeting with a chemical dependency counselor.  He does not like AA.  We will check labs and go from there.  - HIV Antigen Antibody Combo; Future  - Hepatitis C antibody; Future  - Lipid panel reflex to direct LDL Fasting; Future  - Comprehensive metabolic panel (BMP + Alb, Alk Phos, ALT, AST, Total. Bili, TP); Future  - Chlamydia trachomatis/Neisseria gonorrhoeae by PCR - Clinic Collect  - Adult Eye [facility] Referral; Future  - UA Macroscopic with reflex to Microscopic and Culture; Future  - CBC with platelets; Future  - HIV Antigen Antibody Combo  - Hepatitis C antibody  - Lipid panel reflex to direct LDL Fasting  - Comprehensive metabolic panel (BMP + Alb, Alk Phos, ALT, AST, Total. Bili, TP)  - UA Macroscopic with reflex to Microscopic and Culture  - CBC with platelets  - UA Microscopic with Reflex to Culture    2. Asthma, moderate persistent, poorly-controlled  We will resume his inhalers.  He needs to give up the cigarettes  - fluticasone (FLOVENT HFA) 110 MCG/ACT inhaler; Inhale 1 puff into the lungs 2 times daily  Dispense: 12 g; Refill: 3  - albuterol (PROAIR HFA/PROVENTIL HFA/VENTOLIN HFA) 108 (90 Base) MCG/ACT inhaler; Inhale 2 puffs into the lungs every 6 hours as needed for wheezing or cough  Dispense: 18 g; Refill: 3    3. Uncomplicated alcohol dependence (H)  As above, I am concerned he may have significant liver damage.  Given the loose stools he might have pancreatic insufficiency as well.  He refuses chemical dependency today    4. Psoriasis  We will have him meet with dermatology    5. Abdominal pain, generalized  This is vague.  He is not having it currently.  Check labs.  Question if he could have some  chronic pancreatitis versus gastritis versus other    6. Loose stools  As above    7. Urinary frequency  Check PSA and urinalysis  - PSA, tumor marker; Future  - PSA, tumor marker    8. Erectile dysfunction, unspecified erectile dysfunction type  I discussed with him he needs to give up the drinking and that would help with erectile dysfunction.  Same with the smoking.    9. Arthralgia, unspecified joint  He has risk for psoriatic arthritis.  If the joint aches return he is to let me know    10. Routine screening for STI (sexually transmitted infection)    - HIV Antigen Antibody Combo; Future  - Hepatitis C antibody; Future  - HIV Antigen Antibody Combo  - Hepatitis C antibody    11. Hypercholesterolemia  He has a history of high cholesterol in the past.  Check lipids today    Deniz Harmon is a 32 year old, presenting for the following health issues:  Establish Care (Referred by mother (Enma) -   Would like STD labs screening )         View : No data to display.              Healthy Habits:     Getting at least 3 servings of Calcium per day:  Yes    Bi-annual eye exam:  NO    Dental care twice a year:  NO    Sleep apnea or symptoms of sleep apnea:  Excessive snoring    Diet:  Regular (no restrictions)    Frequency of exercise:  1 day/week    Duration of exercise:  Less than 15 minutes    Taking medications regularly:  Yes    Medication side effects:  None    PHQ-2 Total Score: 1    Additional concerns today:  Yes         This is a new patient to the clinic to see me for establishing care and for healthcare maintenance.  He has a history of asthma and ongoing smoking, alcohol abuse, psoriasis.  He has not been seen in some time.  Mother is also a patient, a retired .  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  Currently drinking 3-8 whiskeys a day.  Smoking about three quarters of a pack of cigarettes a day.  Does not wish to contemplate getting either of these up.  He has been having some  "vague intermittent abdominal pains.  Nothing recent.  He does note loose stools though over the last few years.  About 5-6 a day loose stools.  They do seem a little greasy.  He notes urinary frequency with dark urine.  He has not been been able to maintain erections as well.  He notes intermittent hip pains.  He has longstanding psoriasis which is currently not been treated.  He would like to be screened for STIs.  He has a girlfriend of 2 to 3 months.  They have been sexually active.  Using IUD for birth control.      Review of Systems   Constitutional: Negative for chills and fever.   HENT: Positive for congestion and sore throat. Negative for ear pain and hearing loss.    Eyes: Positive for visual disturbance. Negative for pain.   Respiratory: Positive for cough and shortness of breath.    Cardiovascular: Negative for chest pain, palpitations and peripheral edema.   Gastrointestinal: Positive for abdominal pain and diarrhea. Negative for constipation, heartburn, hematochezia and nausea.   Genitourinary: Positive for frequency and impotence. Negative for dysuria, genital sores, hematuria, penile discharge and urgency.   Musculoskeletal: Positive for arthralgias. Negative for joint swelling and myalgias.   Skin: Negative for rash.   Neurological: Negative for dizziness, weakness, headaches and paresthesias.   Psychiatric/Behavioral: Negative for mood changes. The patient is not nervous/anxious.             Objective    /80 (BP Location: Left arm, Patient Position: Sitting, Cuff Size: Adult Regular)   Pulse 100   Temp 98  F (36.7  C) (Tympanic)   Resp 18   Ht 1.715 m (5' 7.5\")   Wt 76.2 kg (168 lb)   SpO2 98%   BMI 25.92 kg/m    Body mass index is 25.92 kg/m .  Physical Exam       HEENT reveals plaque psoriasis especially on his scalp.  He may have some seborrheic dermatitis as well.  No jaundice.  HEENT is unremarkable.  Neck supple without adenopathy.  Lungs are clear bilaterally.  Occasional coarse " cough through the interview though.  Heart is regular without murmurs rubs or gallops.  Abdomen is flat soft nontender.  No splenomegaly noted.  Extremities without edema.  Skin is warm and dry with plaque psoriasis over multiple areas.  No focal neurologic deficits

## 2023-05-08 NOTE — LETTER
May 15, 2023      Faustino Power  [address] [place] [address]        Dear ,    We are writing to inform you of your test results.    A lot of things to discuss here Faustino.  You had a small amount of blood in the urine.  I need to work that up by imaging your urinary tract.  I will then likely have you meet with a urologist.  Your cholesterol looks pretty decent.  Your good cholesterol is quite high, which is protective.  Liver enzymes remain elevated due to your alcohol use.  I will be interested to see what your liver looks like on imaging.  Your red blood cells are large.  This could be due to vitamin B12 deficiency or due to alcohol toxicity of the bone marrow.  I would like you to return for some additional lab work at your convenience.  Please let me know if I could help you with the alcohol by having you meet with one of our chemical dependency counselors.  Someone should be reaching out with you for the CT scan.  Please make a lab appointment in the next week or so.  Resulted Orders   Chlamydia trachomatis/Neisseria gonorrhoeae by PCR - Clinic Collect   Result Value Ref Range    Chlamydia Trachomatis Negative Negative      Comment:      Negative for C. trachomatis rRNA by transcription mediated amplification.   A negative result by transcription mediated amplification does not preclude the presence of infection because results are dependent on proper and adequate collection, absence of inhibitors and sufficient rRNA to be detected.    Neisseria gonorrhoeae Negative Negative      Comment:      Negative for N. gonorrhoeae rRNA by transcription mediated amplification. A negative result by transcription mediated amplification does not preclude the presence of C. trachomatis infection because results are dependent on proper and adequate collection, absence of inhibitors and sufficient rRNA to be detected.   HIV Antigen Antibody Combo   Result Value Ref Range    HIV Antigen Antibody Combo  Nonreactive Nonreactive      Comment:      HIV-1 p24 Ag & HIV-1/HIV-2 Ab Not Detected   Hepatitis C antibody   Result Value Ref Range    Hepatitis C Antibody Nonreactive Nonreactive    Narrative    Assay performance characteristics have not been established for newborns, infants, and children.   Lipid panel reflex to direct LDL Fasting   Result Value Ref Range    Cholesterol 252 (H) <200 mg/dL    Triglycerides 85 <150 mg/dL    Direct Measure  >=40 mg/dL    LDL Cholesterol Calculated 123 (H) <=100 mg/dL    Non HDL Cholesterol 140 (H) <130 mg/dL    Narrative    Cholesterol  Desirable:  <200 mg/dL    Triglycerides  Normal:  Less than 150 mg/dL  Borderline High:  150-199 mg/dL  High:  200-499 mg/dL  Very High:  Greater than or equal to 500 mg/dL    Direct Measure HDL  Female:  Greater than or equal to 50 mg/dL   Male:  Greater than or equal to 40 mg/dL    LDL Cholesterol  Desirable:  <100mg/dL  Above Desirable:  100-129 mg/dL   Borderline High:  130-159 mg/dL   High:  160-189 mg/dL   Very High:  >= 190 mg/dL    Non HDL Cholesterol  Desirable:  130 mg/dL  Above Desirable:  130-159 mg/dL  Borderline High:  160-189 mg/dL  High:  190-219 mg/dL  Very High:  Greater than or equal to 220 mg/dL   Comprehensive metabolic panel (BMP + Alb, Alk Phos, ALT, AST, Total. Bili, TP)   Result Value Ref Range    Sodium 143 136 - 145 mmol/L    Potassium 4.2 3.4 - 5.3 mmol/L    Chloride 103 98 - 107 mmol/L    Carbon Dioxide (CO2) 24 22 - 29 mmol/L    Anion Gap 16 (H) 7 - 15 mmol/L    Urea Nitrogen 9.4 6.0 - 20.0 mg/dL    Creatinine 0.82 0.67 - 1.17 mg/dL    Calcium 9.4 8.6 - 10.0 mg/dL    Glucose 83 70 - 99 mg/dL    Alkaline Phosphatase 107 40 - 129 U/L    AST 85 (H) 10 - 50 U/L    ALT 73 (H) 10 - 50 U/L    Protein Total 7.5 6.4 - 8.3 g/dL    Albumin 4.7 3.5 - 5.2 g/dL    Bilirubin Total 0.4 <=1.2 mg/dL    GFR Estimate >90 >60 mL/min/1.73m2      Comment:      eGFR calculated using 2021 CKD-EPI equation.   UA Macroscopic with reflex  to Microscopic and Culture   Result Value Ref Range    Color Urine Yellow Colorless, Straw, Light Yellow, Yellow    Appearance Urine Clear Clear    Glucose Urine Negative Negative mg/dL    Bilirubin Urine Small (A) Negative    Ketones Urine Trace (A) Negative mg/dL    Specific Gravity Urine >=1.030 1.005 - 1.030    Blood Urine Small (A) Negative    pH Urine 6.0 5.0 - 8.0    Protein Albumin Urine 100 (A) Negative mg/dL    Urobilinogen Urine 0.2 0.2, 1.0 E.U./dL    Nitrite Urine Negative Negative    Leukocyte Esterase Urine Negative Negative   CBC with platelets   Result Value Ref Range    WBC Count 5.3 4.0 - 11.0 10e3/uL    RBC Count 4.54 4.40 - 5.90 10e6/uL    Hemoglobin 16.3 13.3 - 17.7 g/dL    Hematocrit 48.0 40.0 - 53.0 %     (H) 78 - 100 fL    MCH 35.9 (H) 26.5 - 33.0 pg    MCHC 34.0 31.5 - 36.5 g/dL    RDW 12.2 10.0 - 15.0 %    Platelet Count 196 150 - 450 10e3/uL   PSA, tumor marker   Result Value Ref Range    PSA Tumor Marker 0.47 ng/mL      Comment:      No reference ranges have been established for patients under 40 years.    Narrative    This result is obtained using the Roche Elecsys total PSA method on the onel [id number] immunoassay analyzer. Results obtained with different assay methods or kits cannot be used interchangeably.   UA Microscopic with Reflex to Culture   Result Value Ref Range    Bacteria Urine None Seen None Seen /HPF    RBC Urine 2-5 (A) 0-2 /HPF /HPF    WBC Urine 0-5 0-5 /HPF /HPF    Squamous Epithelials Urine Few (A) None Seen /LPF    Mucus Urine Present (A) None Seen /LPF    Narrative    Urine Culture not indicated       If you have any questions or concerns, please call the clinic at the number listed above.       Sincerely,    Nabeel Kelly MD

## 2023-05-09 LAB
C TRACH DNA SPEC QL PROBE+SIG AMP: NEGATIVE
HCV AB SERPL QL IA: NONREACTIVE
HIV 1+2 AB+HIV1 P24 AG SERPL QL IA: NONREACTIVE
N GONORRHOEA DNA SPEC QL NAA+PROBE: NEGATIVE

## 2023-07-19 DIAGNOSIS — J45.40 ASTHMA, MODERATE PERSISTENT, POORLY-CONTROLLED: ICD-10-CM

## 2023-07-19 RX ORDER — FLUTICASONE PROPIONATE 100 UG/1
1 POWDER, METERED RESPIRATORY (INHALATION) EVERY 12 HOURS
Qty: 3 EACH | Refills: 3 | Status: SHIPPED | OUTPATIENT
Start: 2023-07-19 | End: 2024-05-14

## 2023-07-19 RX ORDER — FLUTICASONE PROPIONATE 110 UG/1
1 AEROSOL, METERED RESPIRATORY (INHALATION) 2 TIMES DAILY
Qty: 12 G | Refills: 3 | Status: CANCELLED | OUTPATIENT
Start: 2023-07-19

## 2024-05-14 ENCOUNTER — OFFICE VISIT (OUTPATIENT)
Dept: FAMILY MEDICINE | Facility: CLINIC | Age: 34
End: 2024-05-14
Payer: COMMERCIAL

## 2024-05-14 VITALS
TEMPERATURE: 98.9 F | RESPIRATION RATE: 18 BRPM | HEIGHT: 68 IN | WEIGHT: 181 LBS | SYSTOLIC BLOOD PRESSURE: 124 MMHG | HEART RATE: 90 BPM | DIASTOLIC BLOOD PRESSURE: 64 MMHG | OXYGEN SATURATION: 98 % | BODY MASS INDEX: 27.43 KG/M2

## 2024-05-14 DIAGNOSIS — M54.50 CHRONIC BILATERAL LOW BACK PAIN WITHOUT SCIATICA: ICD-10-CM

## 2024-05-14 DIAGNOSIS — G89.29 CHRONIC BILATERAL LOW BACK PAIN WITHOUT SCIATICA: ICD-10-CM

## 2024-05-14 DIAGNOSIS — J45.40 ASTHMA, MODERATE PERSISTENT, POORLY-CONTROLLED: ICD-10-CM

## 2024-05-14 DIAGNOSIS — F10.20 UNCOMPLICATED ALCOHOL DEPENDENCE (H): ICD-10-CM

## 2024-05-14 DIAGNOSIS — L03.019 CHRONIC PARONYCHIA OF FINGER, UNSPECIFIED LATERALITY: ICD-10-CM

## 2024-05-14 DIAGNOSIS — L40.9 PSORIASIS: ICD-10-CM

## 2024-05-14 PROCEDURE — 36415 COLL VENOUS BLD VENIPUNCTURE: CPT | Performed by: NURSE PRACTITIONER

## 2024-05-14 PROCEDURE — 99214 OFFICE O/P EST MOD 30 MIN: CPT | Performed by: NURSE PRACTITIONER

## 2024-05-14 PROCEDURE — G2211 COMPLEX E/M VISIT ADD ON: HCPCS | Performed by: NURSE PRACTITIONER

## 2024-05-14 PROCEDURE — 80053 COMPREHEN METABOLIC PANEL: CPT | Performed by: NURSE PRACTITIONER

## 2024-05-14 RX ORDER — MUPIROCIN 20 MG/G
OINTMENT TOPICAL 2 TIMES DAILY
Qty: 1 G | Refills: 1 | Status: SHIPPED | OUTPATIENT
Start: 2024-05-14 | End: 2024-07-03

## 2024-05-14 RX ORDER — CLOBETASOL PROPIONATE 0.5 MG/G
OINTMENT TOPICAL 2 TIMES DAILY
Qty: 60 G | Refills: 1 | Status: SHIPPED | OUTPATIENT
Start: 2024-05-14 | End: 2024-07-03

## 2024-05-14 RX ORDER — TRIAMCINOLONE ACETONIDE 0.25 MG/G
OINTMENT TOPICAL 2 TIMES DAILY
Qty: 15 G | Refills: 1 | Status: SHIPPED | OUTPATIENT
Start: 2024-05-14

## 2024-05-14 RX ORDER — FLUTICASONE PROPIONATE 100 UG/1
1 POWDER, METERED RESPIRATORY (INHALATION) EVERY 12 HOURS
Qty: 3 EACH | Refills: 3 | Status: CANCELLED | OUTPATIENT
Start: 2024-05-14

## 2024-05-14 RX ORDER — ALBUTEROL SULFATE 90 UG/1
2 AEROSOL, METERED RESPIRATORY (INHALATION) EVERY 6 HOURS PRN
Qty: 18 G | Refills: 3 | Status: SHIPPED | OUTPATIENT
Start: 2024-05-14

## 2024-05-14 RX ORDER — FLUTICASONE PROPIONATE 110 UG/1
1 AEROSOL, METERED RESPIRATORY (INHALATION) 2 TIMES DAILY
Qty: 12 G | Refills: 2 | Status: SHIPPED | OUTPATIENT
Start: 2024-05-14

## 2024-05-14 ASSESSMENT — ASTHMA QUESTIONNAIRES
QUESTION_4 LAST FOUR WEEKS HOW OFTEN HAVE YOU USED YOUR RESCUE INHALER OR NEBULIZER MEDICATION (SUCH AS ALBUTEROL): NOT AT ALL
ACT_TOTALSCORE: 18
QUESTION_2 LAST FOUR WEEKS HOW OFTEN HAVE YOU HAD SHORTNESS OF BREATH: ONCE OR TWICE A WEEK
QUESTION_1 LAST FOUR WEEKS HOW MUCH OF THE TIME DID YOUR ASTHMA KEEP YOU FROM GETTING AS MUCH DONE AT WORK, SCHOOL OR AT HOME: NONE OF THE TIME
QUESTION_3 LAST FOUR WEEKS HOW OFTEN DID YOUR ASTHMA SYMPTOMS (WHEEZING, COUGHING, SHORTNESS OF BREATH, CHEST TIGHTNESS OR PAIN) WAKE YOU UP AT NIGHT OR EARLIER THAN USUAL IN THE MORNING: TWO OR THREE NIGHTS A WEEK
QUESTION_5 LAST FOUR WEEKS HOW WOULD YOU RATE YOUR ASTHMA CONTROL: POORLY CONTROLLED
ACT_TOTALSCORE: 18

## 2024-05-14 NOTE — LETTER
June 24, 2024      Faustino Power  [address] [place] [address]        Dear ,    We are writing to inform you of your test results.    Gustavo Harmon  Liver enzymes are much better-  The creatinine levels are low, could indicate a problem with the muscles or liver but may be due to something less serious such as not eating good protein, could also indicate a problem with the kidneys.  Decrease drinking alcohol, aim to stop drinking alcohol and sobriety.  Eat a well balanced, healthy diet a drink a good amount of fluids 6-8 cups a day.        Thank you     Katerina Hernandez, CNP    Resulted Orders   Comprehensive metabolic panel   Result Value Ref Range    Sodium 141 135 - 145 mmol/L      Comment:      Reference intervals for this test were updated on 09/26/2023 to more accurately reflect our healthy population. There may be differences in the flagging of prior results with similar values performed with this method. Interpretation of those prior results can be made in the context of the updated reference intervals.     Potassium 4.3 3.4 - 5.3 mmol/L    Carbon Dioxide (CO2) 23 22 - 29 mmol/L    Anion Gap 13 7 - 15 mmol/L    Urea Nitrogen 11.0 6.0 - 20.0 mg/dL    Creatinine 0.62 (L) 0.67 - 1.17 mg/dL    GFR Estimate >90 >60 mL/min/1.73m2    Calcium 9.6 8.6 - 10.0 mg/dL    Chloride 105 98 - 107 mmol/L    Glucose 93 70 - 99 mg/dL    Alkaline Phosphatase 129 40 - 150 U/L      Comment:      Reference intervals for this test were updated on 11/14/2023 to more accurately reflect our healthy population. There may be differences in the flagging of prior results with similar values performed with this method. Interpretation of those prior results can be made in the context of the updated reference intervals.    AST 37 0 - 45 U/L      Comment:      Reference intervals for this test were updated on 6/12/2023 to more accurately reflect our healthy population. There may be differences in the flagging of prior results  with similar values performed with this method. Interpretation of those prior results can be made in the context of the updated reference intervals.    ALT 40 0 - 70 U/L      Comment:      Reference intervals for this test were updated on 6/12/2023 to more accurately reflect our healthy population. There may be differences in the flagging of prior results with similar values performed with this method. Interpretation of those prior results can be made in the context of the updated reference intervals.      Protein Total 7.9 6.4 - 8.3 g/dL    Albumin 4.9 3.5 - 5.2 g/dL    Bilirubin Total 0.2 <=1.2 mg/dL       If you have any questions or concerns, please call the clinic at the number listed above.       Sincerely,      QUITA Gutierrez CNP

## 2024-05-14 NOTE — PROGRESS NOTES
"  Assessment & Plan     Asthma, moderate persistent, poorly-controlled  Has been out of inhalers for 1 month- ACT score 18  Re-start inhalers as previously - follow up in 2-4 weeks or sooner as needed  - albuterol (PROAIR HFA/PROVENTIL HFA/VENTOLIN HFA) 108 (90 Base) MCG/ACT inhaler  Dispense: 18 g; Refill: 3  - fluticasone (FLOVENT HFA) 110 MCG/ACT inhaler  Dispense: 12 g; Refill: 2  - Comprehensive metabolic panel  - Comprehensive metabolic panel    Uncomplicated alcohol dependence (H)  Drinking daily- advised on decreasing drinking /alcohol cessation  Alcohol intake guidelines were reviewed  Will try to work on this on his own- if unsuccessful, then he will RTC and consider medications, or referral to chem dep    Psoriasis  Extensive psoriasis plaques, will start clobetasol  Patient will follow up with derm- has established care with derm clinic outside network  - clobetasol (TEMOVATE) 0.05 % external ointment  Dispense: 60 g; Refill: 1    Chronic paronychia of finger, unspecified laterality  Advised to follow up with dermatology if symptoms worsen or fail to improve -  Has established care with derm outside network  - mupirocin (BACTROBAN) 2 % external ointment  Dispense: 1 g; Refill: 1  - triamcinolone (KENALOG) 0.025 % external ointment  Dispense: 15 g; Refill: 1 (for 2 weeks and then as needed)    Chronic bilateral low back pain without sciatica  Ongoing intermittent, chronic low back pain for several years. No injury. No red-flags.  Will try PT for 1 month - if worsening or not improving RTC and consider imaging  - Physical Therapy  Referral            Nicotine/Tobacco Cessation  He reports that he has been smoking cigarettes. He uses smokeless tobacco.  Nicotine/Tobacco Cessation Plan  Self help information given to patient      BMI  Estimated body mass index is 27.93 kg/m  as calculated from the following:    Height as of this encounter: 1.715 m (5' 7.5\").    Weight as of this encounter: 82.1 kg " (181 lb).   Weight management plan: Discussed healthy diet and exercise guidelines          Deniz Harmon is a 33 year old, presenting for the following health issues:  Infection (Left middle finger/cuticle in finger tenderness and drainage ) and Refill Request (Inhalers )        5/14/2024     2:15 PM   Additional Questions   Roomed by TESSIE Johnston   Accompanied by alone         5/14/2024     2:15 PM   Patient Reported Additional Medications   Patient reports taking the following new medications none     Infection like symptoms on nail cuticle skin edges ongoing intermittently for 6 moths- [facility] and [facility]- problem persistent and went to dermatologist and got prescribed doxy - symptoms persist and occurring weekly, affecting different fingers -     Needs refills on inhalers- has been out of meds for about 1 month  Smoking 1/2 pack a day    Drinking alcohol most nights - whisky and ciders     Has psoriasis - had prescription creams in the past. Also did UV treatment, it helped but expensive    Occasionally gets sciatic nerve pain- about every 6 months gets hip pain. This has been ongoing for 3-4 years.      History of Present Illness       Reason for visit:  Infections in cuticles on fingers  Symptom onset:  More than a month  Symptoms include:  Inflamed areas around cuticles and occasional puss build up  Symptom intensity:  Moderate  Symptom progression:  Staying the same  Had these symptoms before:  No  What makes it worse:  No  What makes it better:  Maybe soaking it    He eats 2-3 servings of fruits and vegetables daily.He consumes 1 sweetened beverage(s) daily.He exercises with enough effort to increase his heart rate 9 or less minutes per day.  He exercises with enough effort to increase his heart rate 3 or less days per week.   He is taking medications regularly.                     Objective    /64 (BP Location: Left arm, Patient Position: Sitting, Cuff Size:  "Adult Regular)   Pulse 90   Temp 98.9  F (37.2  C) (Tympanic)   Resp 18   Ht 1.715 m (5' 7.5\")   Wt 82.1 kg (181 lb)   SpO2 98%   BMI 27.93 kg/m    Body mass index is 27.93 kg/m .  Physical Exam   GENERAL: alert and no distress  NECK: no adenopathy, no asymmetry, masses, or scars  RESP: lungs clear to auscultation - no rales, rhonchi or wheezes  CV: regular rate and rhythm, normal S1 S2, no S3 or S4, no murmur, click or rub, no peripheral edema  ABDOMEN: soft, nontender, no hepatosplenomegaly, no masses and bowel sounds normal  MS: no gross musculoskeletal defects noted, no edema  SKIN: inflamed, mildly erythematous skin at nail borders/edges on x2 findger right hand, x1 finger left hand - no discharge.   Psoriatic plaques on back and arms   PSYCH: mentation appears normal, affect normal/bright            Signed Electronically by: QUITA Gutierrez CNP    "

## 2024-05-15 ENCOUNTER — TELEPHONE (OUTPATIENT)
Dept: FAMILY MEDICINE | Facility: CLINIC | Age: 34
End: 2024-05-15
Payer: COMMERCIAL

## 2024-05-15 LAB
ALBUMIN SERPL BCG-MCNC: 4.9 G/DL (ref 3.5–5.2)
ALP SERPL-CCNC: 129 U/L (ref 40–150)
ALT SERPL W P-5'-P-CCNC: 40 U/L (ref 0–70)
ANION GAP SERPL CALCULATED.3IONS-SCNC: 13 MMOL/L (ref 7–15)
AST SERPL W P-5'-P-CCNC: 37 U/L (ref 0–45)
BILIRUB SERPL-MCNC: 0.2 MG/DL
BUN SERPL-MCNC: 11 MG/DL (ref 6–20)
CALCIUM SERPL-MCNC: 9.6 MG/DL (ref 8.6–10)
CHLORIDE SERPL-SCNC: 105 MMOL/L (ref 98–107)
CREAT SERPL-MCNC: 0.62 MG/DL (ref 0.67–1.17)
DEPRECATED HCO3 PLAS-SCNC: 23 MMOL/L (ref 22–29)
EGFRCR SERPLBLD CKD-EPI 2021: >90 ML/MIN/1.73M2
GLUCOSE SERPL-MCNC: 93 MG/DL (ref 70–99)
POTASSIUM SERPL-SCNC: 4.3 MMOL/L (ref 3.4–5.3)
PROT SERPL-MCNC: 7.9 G/DL (ref 6.4–8.3)
SODIUM SERPL-SCNC: 141 MMOL/L (ref 135–145)

## 2024-05-15 NOTE — TELEPHONE ENCOUNTER
Please start prior authorization     Disp Refills Start End TANYA   clobetasol (TEMOVATE) 0.05 % external ointment 60 g 1 5/14/2024 -- No   Sig - Route: Apply topically 2 times daily - Topical   Sent to pharmacy as: Clobetasol Propionate 0.05 % External Ointment (TEMOVATE)   Class: E-Prescribe   Order: [id number]   E-Prescribing Status: Receipt confirmed by pharmacy (5/14/2024  2:47 PM CDT)     Printout Tracking    External Result Report     Pharmacy    [id number] - [place], [place] - [address] [place], [place] [address]E AT [facility] & [facility]     Associated Diagnoses    Psoriasis [L40.9]  - Primary        Source Order Set    Order Set Name Order ID    [id number]       Prescribing Provider's NPI: [id number]  Katerina Hernandez

## 2024-05-20 PROBLEM — L40.9 PSORIASIS: Status: ACTIVE | Noted: 2024-05-20

## 2024-05-27 NOTE — TELEPHONE ENCOUNTER
Retail Pharmacy Prior Authorization Team   Phone: [phone]    PA Initiation    Medication: CLOBETASOL PROPIONATE 0.05 % EX OINT  Insurance Company: [employer] - Phone [phone] Fax [phone]  Pharmacy Filling the Rx: [facility] #[id number] - [place], [place] - [address] [place] [address] & [facility]  Filling Pharmacy Phone: [phone]  Filling Pharmacy Fax: [phone]  Start Date: 5/27/2024

## 2024-05-30 NOTE — TELEPHONE ENCOUNTER
PRIOR AUTHORIZATION DENIED    Medication: CLOBETASOL PROPIONATE 0.05 % EX OINT  Insurance Company: [employer] - Phone [phone] Fax [phone]  Denial Date: 5/29/2024  Denial Reason(s):         Appeal Information:

## 2024-05-30 NOTE — TELEPHONE ENCOUNTER
Called patient and left voicemail letting him know that the prior authorization had been denied.      Reason for Call:  Prior Authorization was deniedfor this medication   Disp Refills Start End TANYA   clobetasol (TEMOVATE) 0.05 % external ointment 60 g 1 5/14/2024 -- No   Sig - Route: Apply topically 2 times daily - Topical   Sent to pharmacy as: Clobetasol Propionate 0.05 % External Ointment (TEMOVATE)   Class: E-Prescribe   Order: [id number]   E-Prescribing Status: Receipt confirmed by pharmacy (5/14/2024  2:47 PM CDT)       What are your questions or concerns:  Prior authorization denied    Date of last appointment with provider: 5/14/2024

## 2024-07-03 ENCOUNTER — OFFICE VISIT (OUTPATIENT)
Dept: INTERNAL MEDICINE | Facility: CLINIC | Age: 34
End: 2024-07-03
Payer: COMMERCIAL

## 2024-07-03 VITALS
SYSTOLIC BLOOD PRESSURE: 122 MMHG | BODY MASS INDEX: 27.95 KG/M2 | RESPIRATION RATE: 17 BRPM | HEIGHT: 69 IN | TEMPERATURE: 98.2 F | DIASTOLIC BLOOD PRESSURE: 58 MMHG | WEIGHT: 188.7 LBS | OXYGEN SATURATION: 99 % | HEART RATE: 92 BPM

## 2024-07-03 DIAGNOSIS — Z00.00 ROUTINE GENERAL MEDICAL EXAMINATION AT A HEALTH CARE FACILITY: Primary | ICD-10-CM

## 2024-07-03 DIAGNOSIS — D75.89 MACROCYTOSIS WITHOUT ANEMIA: ICD-10-CM

## 2024-07-03 DIAGNOSIS — R31.29 MICROSCOPIC HEMATURIA: ICD-10-CM

## 2024-07-03 DIAGNOSIS — J45.40 MODERATE PERSISTENT ASTHMA WITHOUT COMPLICATION: ICD-10-CM

## 2024-07-03 DIAGNOSIS — L40.9 PSORIASIS: ICD-10-CM

## 2024-07-03 DIAGNOSIS — F17.200 TOBACCO USE DISORDER: ICD-10-CM

## 2024-07-03 DIAGNOSIS — F10.20 UNCOMPLICATED ALCOHOL DEPENDENCE (H): ICD-10-CM

## 2024-07-03 LAB
ALBUMIN UR-MCNC: NEGATIVE MG/DL
APPEARANCE UR: CLEAR
BILIRUB UR QL STRIP: NEGATIVE
COLOR UR AUTO: YELLOW
ERYTHROCYTE [DISTWIDTH] IN BLOOD BY AUTOMATED COUNT: 12.1 % (ref 10–15)
FOLATE SERPL-MCNC: 13.6 NG/ML (ref 4.6–34.8)
GLUCOSE UR STRIP-MCNC: NEGATIVE MG/DL
HCT VFR BLD AUTO: 41 % (ref 40–53)
HGB BLD-MCNC: 13.8 G/DL (ref 13.3–17.7)
HGB UR QL STRIP: NEGATIVE
KETONES UR STRIP-MCNC: NEGATIVE MG/DL
LEUKOCYTE ESTERASE UR QL STRIP: NEGATIVE
MCH RBC QN AUTO: 31.7 PG (ref 26.5–33)
MCHC RBC AUTO-ENTMCNC: 33.7 G/DL (ref 31.5–36.5)
MCV RBC AUTO: 94 FL (ref 78–100)
NITRATE UR QL: NEGATIVE
PH UR STRIP: 6 [PH] (ref 5–8)
PLATELET # BLD AUTO: 246 10E3/UL (ref 150–450)
RBC # BLD AUTO: 4.35 10E6/UL (ref 4.4–5.9)
SP GR UR STRIP: >=1.03 (ref 1–1.03)
UROBILINOGEN UR STRIP-ACNC: 0.2 E.U./DL
WBC # BLD AUTO: 7.7 10E3/UL (ref 4–11)

## 2024-07-03 PROCEDURE — 82607 VITAMIN B-12: CPT | Performed by: INTERNAL MEDICINE

## 2024-07-03 PROCEDURE — 90480 ADMN SARSCOV2 VAC 1/ONLY CMP: CPT | Performed by: INTERNAL MEDICINE

## 2024-07-03 PROCEDURE — 90651 9VHPV VACCINE 2/3 DOSE IM: CPT | Performed by: INTERNAL MEDICINE

## 2024-07-03 PROCEDURE — 82746 ASSAY OF FOLIC ACID SERUM: CPT | Performed by: INTERNAL MEDICINE

## 2024-07-03 PROCEDURE — 80053 COMPREHEN METABOLIC PANEL: CPT | Performed by: INTERNAL MEDICINE

## 2024-07-03 PROCEDURE — 91320 SARSCV2 VAC 30MCG TRS-SUC IM: CPT | Performed by: INTERNAL MEDICINE

## 2024-07-03 PROCEDURE — 36415 COLL VENOUS BLD VENIPUNCTURE: CPT | Performed by: INTERNAL MEDICINE

## 2024-07-03 PROCEDURE — 80061 LIPID PANEL: CPT | Performed by: INTERNAL MEDICINE

## 2024-07-03 PROCEDURE — 99214 OFFICE O/P EST MOD 30 MIN: CPT | Mod: 25 | Performed by: INTERNAL MEDICINE

## 2024-07-03 PROCEDURE — 85027 COMPLETE CBC AUTOMATED: CPT | Performed by: INTERNAL MEDICINE

## 2024-07-03 PROCEDURE — 99395 PREV VISIT EST AGE 18-39: CPT | Mod: 25 | Performed by: INTERNAL MEDICINE

## 2024-07-03 PROCEDURE — 90471 IMMUNIZATION ADMIN: CPT | Performed by: INTERNAL MEDICINE

## 2024-07-03 PROCEDURE — 81003 URINALYSIS AUTO W/O SCOPE: CPT | Performed by: INTERNAL MEDICINE

## 2024-07-03 RX ORDER — TRIAMCINOLONE ACETONIDE 5 MG/G
CREAM TOPICAL 2 TIMES DAILY
Qty: 454 G | Refills: 0 | Status: SHIPPED | OUTPATIENT
Start: 2024-07-03

## 2024-07-03 SDOH — HEALTH STABILITY: PHYSICAL HEALTH: ON AVERAGE, HOW MANY DAYS PER WEEK DO YOU ENGAGE IN MODERATE TO STRENUOUS EXERCISE (LIKE A BRISK WALK)?: 4 DAYS

## 2024-07-03 SDOH — HEALTH STABILITY: PHYSICAL HEALTH: ON AVERAGE, HOW MANY MINUTES DO YOU ENGAGE IN EXERCISE AT THIS LEVEL?: 20 MIN

## 2024-07-03 ASSESSMENT — ASTHMA QUESTIONNAIRES
QUESTION_3 LAST FOUR WEEKS HOW OFTEN DID YOUR ASTHMA SYMPTOMS (WHEEZING, COUGHING, SHORTNESS OF BREATH, CHEST TIGHTNESS OR PAIN) WAKE YOU UP AT NIGHT OR EARLIER THAN USUAL IN THE MORNING: ONCE A WEEK
ACUTE_EXACERBATION_TODAY: NO
ACT_TOTALSCORE: 16
QUESTION_5 LAST FOUR WEEKS HOW WOULD YOU RATE YOUR ASTHMA CONTROL: POORLY CONTROLLED
QUESTION_1 LAST FOUR WEEKS HOW MUCH OF THE TIME DID YOUR ASTHMA KEEP YOU FROM GETTING AS MUCH DONE AT WORK, SCHOOL OR AT HOME: NONE OF THE TIME
QUESTION_2 LAST FOUR WEEKS HOW OFTEN HAVE YOU HAD SHORTNESS OF BREATH: THREE TO SIX TIMES A WEEK
QUESTION_4 LAST FOUR WEEKS HOW OFTEN HAVE YOU USED YOUR RESCUE INHALER OR NEBULIZER MEDICATION (SUCH AS ALBUTEROL): TWO OR THREE TIMES PER WEEK
ACT_TOTALSCORE: 16

## 2024-07-03 ASSESSMENT — SOCIAL DETERMINANTS OF HEALTH (SDOH): HOW OFTEN DO YOU GET TOGETHER WITH FRIENDS OR RELATIVES?: ONCE A WEEK

## 2024-07-03 NOTE — PATIENT INSTRUCTIONS
Patient Education   Preventive Care Advice   This is general advice given by our system to help you stay healthy. However, your care team may have specific advice just for you. Please talk to your care team about your preventive care needs.  Nutrition  Eat 5 or more servings of fruits and vegetables each day.  Try wheat bread, brown rice and whole grain pasta (instead of white bread, rice, and pasta).  Get enough calcium and vitamin D. Check the label on foods and aim for 100% of the RDA (recommended daily allowance).  Lifestyle  Exercise at least 150 minutes each week  (30 minutes a day, 5 days a week).  Do muscle strengthening activities 2 days a week. These help control your weight and prevent disease.  No smoking.  Wear sunscreen to prevent skin cancer.  Have a dental exam and cleaning every 6 months.  Yearly exams  See your health care team every year to talk about:  Any changes in your health.  Any medicines your care team has prescribed.  Preventive care, family planning, and ways to prevent chronic diseases.  Shots (vaccines)   HPV shots (up to age 26), if you've never had them before.  Hepatitis B shots (up to age 59), if you've never had them before.  COVID-19 shot: Get this shot when it's due.  Flu shot: Get a flu shot every year.  Tetanus shot: Get a tetanus shot every 10 years.  Pneumococcal, hepatitis A, and RSV shots: Ask your care team if you need these based on your risk.  Shingles shot (for age 50 and up)  General health tests  Diabetes screening:  Starting at age 35, Get screened for diabetes at least every 3 years.  If you are younger than age 35, ask your care team if you should be screened for diabetes.  Cholesterol test: At age 39, start having a cholesterol test every 5 years, or more often if advised.  Bone density scan (DEXA): At age 50, ask your care team if you should have this scan for osteoporosis (brittle bones).  Hepatitis C: Get tested at least once in your life.  STIs (sexually  transmitted infections)  Before age 24: Ask your care team if you should be screened for STIs.  After age 24: Get screened for STIs if you're at risk. You are at risk for STIs (including HIV) if:  You are sexually active with more than one person.  You don't use condoms every time.  You or a partner was diagnosed with a sexually transmitted infection.  If you are at risk for HIV, ask about PrEP medicine to prevent HIV.  Get tested for HIV at least once in your life, whether you are at risk for HIV or not.  Cancer screening tests  Cervical cancer screening: If you have a cervix, begin getting regular cervical cancer screening tests starting at age 21.  Breast cancer scan (mammogram): If you've ever had breasts, begin having regular mammograms starting at age 40. This is a scan to check for breast cancer.  Colon cancer screening: It is important to start screening for colon cancer at age 45.  Have a colonoscopy test every 10 years (or more often if you're at risk) Or, ask your provider about stool tests like a FIT test every year or Cologuard test every 3 years.  To learn more about your testing options, visit:   .  For help making a decision, visit:   https://bit.ly/og96579.  Prostate cancer screening test: If you have a prostate, ask your care team if a prostate cancer screening test (PSA) at age 55 is right for you.  Lung cancer screening: If you are a current or former smoker ages 50 to 80, ask your care team if ongoing lung cancer screenings are right for you.  For informational purposes only. Not to replace the advice of your health care provider. Copyright   2023 [facility]. All rights reserved. Clinically reviewed by the [facility]. [place] [id number] - REV 01/24.  9 Ways to Cut Back on Drinking  Maybe you've found yourself drinking more alcohol than you'd prefer. If you want to cut back, here are some ideas to try.    Think before you drink.  Do you really want a drink,  "or is it just a habit? If you're used to having a drink at a certain time, try doing something else then.     Look for substitutes.  Find some no-alcohol drinks that you enjoy, like flavored seltzer water, tea with honey, or tonic with a slice of lime. Or try alcohol-free beer or \"virgin\" cocktails (without the alcohol).     Drink more water.  Use water to quench your thirst. Drink a glass of water before you have any alcohol. Have another glass along with every drink or between drinks.     Shrink your drink.  For example, have a bottle of beer instead of a pint. Use a smaller glass for wine. Choose drinks with lower alcohol content (ABV%). Or use less liquor and more mixer in cocktails.     Slow down.  It's easy to drink quickly and without thinking about it. Pay attention, and make each drink last longer.     Do the math.  Total up how much you spend on alcohol each month. How much is that a year? If you cut back, what could you do with the money you save?     Take a break.  Choose a day or two each week when you won't drink at all. Notice how you feel on those days, physically and emotionally. How did you sleep? Do you feel better? Over time, add more break days.     Count calories.  Would you like to lose some weight? For some people that's a good motivator for cutting back. Figure out how many calories are in each drink. How many does that add up to in a day? In a week? In a month?     Practice saying no.  Be ready when someone offers you a drink. Try: \"Thanks, I've had enough.\" Or \"Thanks, but I'm cutting back.\" Or \"No, thanks. I feel better when I drink less.\"   Current as of: November 15, 2023               Content Version: 14.0    1863-1581 [employer].   Care instructions adapted under license by your healthcare professional. If you have questions about a medical condition or this instruction, always ask your healthcare professional. [employer] disclaims any warranty or liability " for your use of this information.    Substance Use Disorder: Care Instructions  Overview     You can improve your life and health by stopping your use of alcohol or drugs. When you don't drink or use drugs, you may feel and sleep better. You may get along better with your family, friends, and coworkers. There are medicines and programs that can help with substance use disorder.  How can you care for yourself at home?  Here are some ways to help you stay sober and prevent relapse.  If you have been given medicine to help keep you sober or reduce your cravings, be sure to take it exactly as prescribed.  Talk to your doctor about programs that can help you stop using drugs or drinking alcohol.  Do not keep alcohol or drugs in your home.  Plan ahead. Think about what you'll say if other people ask you to drink or use drugs. Try not to spend time with people who drink or use drugs.  Use the time and money spent on drinking or drugs to do something that's important to you.  Preventing a relapse  Have a plan to deal with relapse. Learn to recognize changes in your thinking that lead you to drink or use drugs. Get help before you start to drink or use drugs again.  Try to stay away from situations, friends, or places that may lead you to drink or use drugs.  If you feel the need to drink alcohol or use drugs again, seek help right away. Call a trusted friend or family member. Some people get support from organizations such as Narcotics Anonymous or [employer] or from treatment facilities.  If you relapse, get help as soon as you can. Some people make a plan with another person that outlines what they want that person to do for them if they relapse. The plan usually includes how to handle the relapse and who to notify in case of relapse.  Don't give up. Remember that a relapse doesn't mean that you have failed. Use the experience to learn the triggers that lead you to drink or use drugs. Then quit again. Recovery is a  "lifelong process. Many people have several relapses before they are able to quit for good.  Follow-up care is a key part of your treatment and safety. Be sure to make and go to all appointments, and call your doctor if you are having problems. It's also a good idea to know your test results and keep a list of the medicines you take.  When should you call for help?   Call 911  anytime you think you may need emergency care. For example, call if you or someone else:    Has overdosed or has withdrawal signs. Be sure to tell the emergency workers that you are or someone else is using or trying to quit using drugs. Overdose or withdrawal signs may include:  Losing consciousness.  Seizure.  Seeing or hearing things that aren't there (hallucinations).     Is thinking or talking about suicide or harming others.   Where to get help 24 hours a day, 7 days a week   If you or someone you know talks about suicide, self-harm, a mental health crisis, a substance use crisis, or any other kind of emotional distress, get help right away. You can:    Call the Suicide and Crisis Lifeline at 988.     Call 5-362-770-SMAC (1-653.519.5178).     Text HOME to 588877 to access the Crisis Text Line.   Consider saving these numbers in your phone.  Go to [employer] for more information or to chat online.  Call your doctor now or seek immediate medical care if:    You are having withdrawal symptoms. These may include nausea or vomiting, sweating, shakiness, and anxiety.   Watch closely for changes in your health, and be sure to contact your doctor if:    You have a relapse.     You need more help or support to stop.   Where can you learn more?  Go to https://www.healthwise.net/patiented  Enter H573 in the search box to learn more about \"Substance Use Disorder: Care Instructions.\"  Current as of: November 15, 2023               Content Version: 14.0    4649-1006 [employer].   Care instructions adapted under license by your " healthcare professional. If you have questions about a medical condition or this instruction, always ask your healthcare professional. [employer] disclaims any warranty or liability for your use of this information.

## 2024-07-03 NOTE — PROGRESS NOTES
Preventive Care Visit  [facility]  SEBASTIAN SEVERINO MD, Internal Medicine  Jul 3, 2024      1. Routine general medical examination at a health care facility  We updated immunizations today.  Stay as active as he can be.  I have urged alcohol and tobacco cessation.  See below.  - Comprehensive metabolic panel (BMP + Alb, Alk Phos, ALT, AST, Total. Bili, TP); Future  - Lipid panel reflex to direct LDL Fasting; Future  - CBC with platelets; Future  - UA Macroscopic with reflex to Microscopic and Culture - Lab Collect; Future  - Comprehensive metabolic panel (BMP + Alb, Alk Phos, ALT, AST, Total. Bili, TP)  - Lipid panel reflex to direct LDL Fasting  - CBC with platelets  - UA Macroscopic with reflex to Microscopic and Culture - Lab Collect    2. Uncomplicated alcohol dependence (H)  Consider trial of naltrexone.  I did give him some information regarding that on MyChart.  He will let me know if he wants to pursue it.  He does not wish to undergo chemical dependency evaluation.    3. Tobacco use disorder  We discussed smoking cessation.  He has cut back.    4. Moderate persistent asthma without complication  Continue his inhalers.    5. Psoriasis  He may benefit from systemic therapy.  Will give him a higher intensity steroid for his plaques on his back etc.  - triamcinolone (ARISTOCORT HP) 0.5 % external cream; Apply topically 2 times daily To psoriatic plaques  Dispense: 454 g; Refill: 0  - Adult Dermatology  Referral; Future    6. Microscopic hematuria  He was found to have microscopic hematuria and he failed to do the first workup last year.  Recheck today.  If persistent will need to undergo a CT scan and urologic evaluation.  - CBC with platelets; Future  - UA Macroscopic with reflex to Microscopic and Culture - Lab Collect; Future  - CBC with platelets  - UA Macroscopic with reflex to Microscopic and Culture - Lab Collect    7. Macrocytosis without anemia  Likely due to alcohol use.   Check labs today however.  - Vitamin B12; Future  - Folate; Future  - Vitamin B12  - Folate     Deniz Harmon is a 33 year old, presenting for the following:  Annual Visit        7/3/2024     3:02 PM   Additional Questions   Roomed by Kirby SUMNER RN        Health Care Directive  Patient does not have a Health Care Directive or Living Will: Discussed advance care planning with patient; information given to patient to review.    MARTIN Harmon comes in today for his annual physical.  He has a history of alcohol and tobacco abuse.  He is and has no interest in giving these up at this point.  He states he feels well.  His psoriasis continues to bother him.  He continues to date the same woman.  It is going well and she has moved in.  He has cut back his alcohol and his smoking but has not given up completely.  He does note desire to meet with the chemical dependency counselor.  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  Asthma has been well-controlled with his current regimen.            7/3/2024   General Health   How would you rate your overall physical health? (!) FAIR   Feel stress (tense, anxious, or unable to sleep) Only a little      (!) STRESS CONCERN      7/3/2024   Nutrition   Three or more servings of calcium each day? Yes   Diet: Regular (no restrictions)   How many servings of fruit and vegetables per day? (!) 2-3   How many sweetened beverages each day? 0-1            7/3/2024   Exercise   Days per week of moderate/strenous exercise 4 days   Average minutes spent exercising at this level 20 min            7/3/2024   Social Factors   Frequency of gathering with friends or relatives Once a week   Worry food won't last until get money to buy more No   Food not last or not have enough money for food? No   Do you have housing? (Housing is defined as stable permanent housing and does not include staying ouside in a car, in a tent, in an abandoned building, in an overnight shelter, or couch-surfing.) Yes   Are  you worried about losing your housing? No   Lack of transportation? No   Unable to get utilities (heat,electricity)? No            7/3/2024   Dental   Dentist two times every year? (!) NO            7/3/2024   TB Screening   Were you born outside of the [place]? No              Today's PHQ-2 Score:       5/14/2024     1:38 AM   PHQ-2 ( 1999 [employer])   Q1: Little interest or pleasure in doing things 0   Q2: Feeling down, depressed or hopeless 0   PHQ-2 Score 0   Q1: Little interest or pleasure in doing things Not at all   Q2: Feeling down, depressed or hopeless Not at all   PHQ-2 Score 0         7/3/2024   Substance Use   If I could quit smoking, I would Somewhat disagree   I want to quit somking, worry about health affects Somewhat disagree   Willing to make a plan to quit smoking Somewhat disagree   Willing to cut down before quitting Somewhat agree   Alcohol more than 3/day or more than 7/wk Yes   How often do you have a drink containing alcohol 4 or more times a week   How many alcohol drinks on typical day 5 or 6   How often do you have 5+ drinks at one occasion Weekly   Audit 2/3 Score 5   How often not able to stop drinking once started Monthly   How often failed to do what normally expected Less than monthly   How often needed first drink in am after a heavy drinking session Less than monthly   How often feeling of guilt or remorse after drinking Less than monthly   How often unable to remember what happened the night before Monthly   Have you or someone else been injured because of your drinking No   Has anyone been concerned or suggested you cut down on drinking Yes, during the last year   TOTAL SCORE - AUDIT 20   Do you use any other substances recreationally? (!) ALCOHOL    (!) CANNABIS PRODUCTS       Multiple values from one day are sorted in reverse-chronological order     Social History     Tobacco Use    Smoking status: Every Day     Current packs/day: 1.00     Types: Cigarettes    Smokeless tobacco:  "Current   Vaping Use    Vaping status: Every Day    Substances: Nicotine    Devices: Disposable   Substance Use Topics    Alcohol use: Yes     Alcohol/week: 28.0 standard drinks of alcohol           7/3/2024   STI Screening   New sexual partner(s) since last STI/HIV test? No            7/3/2024   Contraception/Family Planning   Questions about contraception or family planning No           Reviewed and updated as needed this visit by Provider                             Objective    Exam  /58 (BP Location: Left arm, Patient Position: Sitting, Cuff Size: Adult Regular)   Pulse 92   Temp 98.2  F (36.8  C) (Tympanic)   Resp 17   Ht 1.74 m (5' 8.5\")   Wt 85.6 kg (188 lb 11.2 oz)   SpO2 99%   BMI 28.27 kg/m     Estimated body mass index is 28.27 kg/m  as calculated from the following:    Height as of this encounter: 1.74 m (5' 8.5\").    Weight as of this encounter: 85.6 kg (188 lb 11.2 oz).    Physical Exam  GENERAL: alert and no distress  EYES: Eyes grossly normal to inspection, PERRL and conjunctivae and sclerae normal  HENT: ear canals and TM's normal, nose and mouth without ulcers or lesions  NECK: no adenopathy, no asymmetry, masses, or scars  RESP: lungs clear to auscultation - no rales, rhonchi or wheezes  CV: regular rate and rhythm, normal S1 S2, no S3 or S4, no murmur, click or rub, no peripheral edema  ABDOMEN: soft, nontender, no hepatosplenomegaly, no masses and bowel sounds normal  MS: no gross musculoskeletal defects noted, no edema  SKIN: Multiple psoriatic plaques as well as probable psoriatic changes to the nails bilaterally.  NEURO: Normal strength and tone, mentation intact and speech normal  PSYCH: mentation appears normal, affect normal/bright        Signed Electronically by: SEBASTIAN SEVERINO MD    "

## 2024-07-04 LAB
ALBUMIN SERPL BCG-MCNC: 4.7 G/DL (ref 3.5–5.2)
ALP SERPL-CCNC: 88 U/L (ref 40–150)
ALT SERPL W P-5'-P-CCNC: 42 U/L (ref 0–70)
ANION GAP SERPL CALCULATED.3IONS-SCNC: 12 MMOL/L (ref 7–15)
AST SERPL W P-5'-P-CCNC: 32 U/L (ref 0–45)
BILIRUB SERPL-MCNC: 0.3 MG/DL
BUN SERPL-MCNC: 9.5 MG/DL (ref 6–20)
CALCIUM SERPL-MCNC: 9.6 MG/DL (ref 8.6–10)
CHLORIDE SERPL-SCNC: 102 MMOL/L (ref 98–107)
CHOLEST SERPL-MCNC: 302 MG/DL
CREAT SERPL-MCNC: 0.67 MG/DL (ref 0.67–1.17)
DEPRECATED HCO3 PLAS-SCNC: 24 MMOL/L (ref 22–29)
EGFRCR SERPLBLD CKD-EPI 2021: >90 ML/MIN/1.73M2
FASTING STATUS PATIENT QL REPORTED: YES
FASTING STATUS PATIENT QL REPORTED: YES
GLUCOSE SERPL-MCNC: 99 MG/DL (ref 70–99)
HDLC SERPL-MCNC: 68 MG/DL
LDLC SERPL CALC-MCNC: 173 MG/DL
NONHDLC SERPL-MCNC: 234 MG/DL
POTASSIUM SERPL-SCNC: 4 MMOL/L (ref 3.4–5.3)
PROT SERPL-MCNC: 7.8 G/DL (ref 6.4–8.3)
SODIUM SERPL-SCNC: 138 MMOL/L (ref 135–145)
TRIGL SERPL-MCNC: 306 MG/DL
VIT B12 SERPL-MCNC: 170 PG/ML (ref 232–1245)

## 2024-07-18 ENCOUNTER — TELEPHONE (OUTPATIENT)
Dept: INTERNAL MEDICINE | Facility: CLINIC | Age: 34
End: 2024-07-18
Payer: COMMERCIAL

## 2024-07-18 NOTE — TELEPHONE ENCOUNTER
"See message from the provider to the patient on 7/18/24:    ----- Message from SEBASTIAN KELLY sent at 7/15/2024  8:16 AM CDT -----  \"Team - please call patient with results.  Did not review mychart.  Thanks.\"     See lab result notes from the provider to the patient on 7/9/24:    \"Faustino, your cholesterol is up quite a bit.  I need you to try to be better about your diet and exercise ,as we discussed at your visit.  I am glad to see your liver enzymes are normal.  Blood counts look better too.  You do have low vitamin B12.  I want you to start taking 1000 mcg of vitamin B12 every day--you can buy this at any pharmacy.  You may want to start a cheap One-A-Day multivitamin as ...\"    Written by Sebastian Kelly MD on 7/9/2024  5:48 PM CDT  Seen by patient Faustino Power on 7/17/2024  1:07 PM    Patient read the above MyChart lab results on 7/17/24.    Writer will close this encounter.    Tasha Bender, RN, BSN  [facility]    "

## 2025-08-10 ENCOUNTER — HEALTH MAINTENANCE LETTER (OUTPATIENT)
Age: 35
End: 2025-08-10